# Patient Record
Sex: MALE | Race: WHITE | NOT HISPANIC OR LATINO | Employment: FULL TIME | URBAN - METROPOLITAN AREA
[De-identification: names, ages, dates, MRNs, and addresses within clinical notes are randomized per-mention and may not be internally consistent; named-entity substitution may affect disease eponyms.]

---

## 2020-07-20 ENCOUNTER — OFFICE VISIT (OUTPATIENT)
Dept: URGENT CARE | Facility: CLINIC | Age: 50
End: 2020-07-20
Payer: COMMERCIAL

## 2020-07-20 VITALS
RESPIRATION RATE: 16 BRPM | BODY MASS INDEX: 29.22 KG/M2 | TEMPERATURE: 98 F | WEIGHT: 235 LBS | HEART RATE: 96 BPM | OXYGEN SATURATION: 97 % | HEIGHT: 75 IN | SYSTOLIC BLOOD PRESSURE: 120 MMHG | DIASTOLIC BLOOD PRESSURE: 83 MMHG

## 2020-07-20 DIAGNOSIS — Z03.818 ENCOUNTER FOR OBSERVATION FOR SUSPECTED EXPOSURE TO OTHER BIOLOGICAL AGENTS RULED OUT: ICD-10-CM

## 2020-07-20 DIAGNOSIS — B34.9 VIRAL SYNDROME: Primary | ICD-10-CM

## 2020-07-20 PROCEDURE — U0003 INFECTIOUS AGENT DETECTION BY NUCLEIC ACID (DNA OR RNA); SEVERE ACUTE RESPIRATORY SYNDROME CORONAVIRUS 2 (SARS-COV-2) (CORONAVIRUS DISEASE [COVID-19]), AMPLIFIED PROBE TECHNIQUE, MAKING USE OF HIGH THROUGHPUT TECHNOLOGIES AS DESCRIBED BY CMS-2020-01-R: HCPCS

## 2020-07-20 PROCEDURE — 99203 OFFICE O/P NEW LOW 30 MIN: CPT | Mod: S$GLB,,, | Performed by: NURSE PRACTITIONER

## 2020-07-20 PROCEDURE — 99203 PR OFFICE/OUTPT VISIT, NEW, LEVL III, 30-44 MIN: ICD-10-PCS | Mod: S$GLB,,, | Performed by: NURSE PRACTITIONER

## 2020-07-20 NOTE — LETTER
[address] [facility] ? [place], [address] ? Phone [phone] ? Fax [phone]           Return to Work/School    Patient: Norris Wilkerson  YOB: 1970   Date: 07/20/2020      To Whom It May Concern:     Norris Wilkerson was in contact with/seen in my office on 07/20/2020. COVID-19 is present in our communities across the state. Not all patients are eligible or appropriate to be tested. In this situation, your employee meets the following criteria:     Norris Wilkerson has met the criteria for COVID-19 testing based upon symptoms, travel, and/or potential exposure. The test has been completed and is pending results at this time. During this time the employee is not able to work and should be quarantined per the Centers for Disease Control timelines.      If you have any questions or concerns, or if I can be of further assistance, please do not hesitate to contact me.     Sincerely,      Milagro Petersen NP

## 2020-07-20 NOTE — PATIENT INSTRUCTIONS
"Please follow up with your Primary care provider within 2-5 days if your signs and symptoms have not resolved or worsen.  The usual course of cold symptoms are 10-14 days.     If your condition worsens or fails to improve we recommend that you receive another evaluation at the emergency room immediately or contact your primary medical clinic to discuss your concerns.     You must understand that you have received an Urgent Care treatment only and that you may be released before all of your medical problems are known or treated.   You, the patient, will arrange for follow up care as instructed.     Tylenol or Ibuprofen can also be used as directed for pain/fever unless you have an allergy to them or medical condition such as stomach ulcers, kidney or liver disease or blood thinners etc for which you should not be taking these type of medications.     Take over the counter cough medication as directed as needed for cough.  You should avoid medications with pseudoephedrine or phenylephrine (any medication with "D") if you have high blood pressure as this can cause an elevation in your blood pressure. Instead consider Corcidin HBP as needed to prevent an elevated blood pressure.     Natural remedies of symptoms (as needed) include humidification, saline nasal sprays, and/or steamy showers.  Increase fluids, warm tea with honey, cough drops as needed.  You may also use salt water gargles for sore throat.    IF you received a oral steroid today - As discussed, this can elevate your blood pressure, elevate your blood sugar, water weight gain, nervous energy, redness to the face and dimpling of the skin at the injection site.       Instructions for Patients with Confirmed or Suspected COVID-19    If you are awaiting your test result, you will either be called or it will be released to the patient portal.  If you have any questions about your test, please visit [email or web address] or call our COVID-19 information " line at [phone].      Preventing the Spread of Coronavirus Disease 2019 (COVID-19) in Homes and Residential Communities -- Patients     Prevention steps for people with confirmed or suspected COVID-19 (including persons under investigation) who do not need to be hospitalized and people with confirmed COVID-19 who were hospitalized and determined to be medically stable to go home.      Stay home except to get medical care.    Separate yourself from other people and animals in your home.    Call ahead before visiting your doctor.    Wear a face mask.    Cover your coughs and sneezes.    Clean your hands often.    Avoid sharing personal household items.    Clean all high-touch surfaces every day.    Monitor your symptoms. Seek prompt medical attention if your illness is worsening (e.g., difficulty breathing). Before seeking care, call your healthcare provider.    If you have a medical emergency and must call 911, notify the dispatcher that you have or are being evaluated for COVID-19. If possible, put on a face mask before emergency medical services arrive.    Use the following symptom-based strategy to return to normal activity following a suspected or confirmed case of COVID-19. Continue isolation until:   o At least 3 days (72 hours) have passed since recovery defined as resolution of fever without the use of fever-reducing medications and improvement in respiratory symptoms (e.g. cough, shortness of breath), and   o At least 10 days have passed since symptoms first appeared.     Precautions for household members, intimate partners and caregivers in a non-healthcare setting of a patient with symptomatic laboratory-confirmed COVID-19 or a patient under investigation.     Household members, intimate partners and caregivers in a non-healthcare setting may have close contact with a person with symptomatic, laboratory-confirmed COVID-19 or a person under investigation. Close contacts should monitor  their health; they should call their healthcare provider right away if they develop symptoms suggestive of COVID-19 (e.g., fever, cough, shortness of breath). Close contacts should also follow these recommendations:     · Stay home for the duration of the time recommended by healthcare provider, except to get medical care. Separate yourself from other people and animals in the home.  · Monitor the patients symptoms. If the patient is getting sicker, call his or her healthcare provider. If the patient has a medical emergency and you need to call 911, notify the dispatch personnel that the patient has or is being evaluated for COVID-19.   · Wear a facemask when around other people such as sharing a room or vehicle and before entering a healthcare provider's office.  · Cover coughs and sneezes with a tissue. Throw used tissues in a lined trash can immediately and wash hands.  · Clean hands often with soap and water for at least 20 seconds or with an alcohol-based hand , rubbing hands together until they feel dry. Avoid touching your eyes, nose, and mouth with unwashed hands.  · Clean all high-touch; surfaces every day, including counters, tabletops, doorknobs, bathroom fixtures, toilets, phones, keyboards, tablets, bedside tables, etc. Use a household cleaning spray or wipe according to label instructions.  · Avoid sharing personal household items such as dishes, drinking glasses, cups, towels, bedding, etc. After these items are used, they should be washed thoroughly with soap and water.  · Use the following symptom-based strategy to return to normal activity following a suspected or confirmed case of COVID-19. Continue isolation until:       Viral Syndrome (Adult)  A viral illness may cause a number of symptoms. The symptoms depend on the part of the body that the virus affects. If it settles in your nose, throat, and lungs, it may cause cough, sore throat, congestion, and sometimes headache. If it  "settles in your stomach and intestinal tract, it may cause vomiting and diarrhea. Sometimes it causes vague symptoms like "aching all over," feeling tired, loss of appetite, or fever.  A viral illness usually lasts 1 to 2 weeks, but sometimes it lasts longer. In some cases, a more serious infection can look like a viral syndrome in the first few days of the illness. You may need another exam and additional tests to know the difference. Watch for the warning signs listed below.  Home care  Follow these guidelines for taking care of yourself at home:  · If symptoms are severe, rest at home for the first 2 to 3 days.  · Stay away from cigarette smoke - both your smoke and the smoke from others.  · You may use over-the-counter acetaminophen or ibuprofen for fever, muscle aching, and headache, unless another medicine was prescribed for this. If you have chronic liver or kidney disease or ever had a stomach ulcer or GI bleeding, talk with your doctor before using these medicines. No one who is younger than 18 and ill with a fever should take aspirin. It may cause severe disease or death.  · Your appetite may be poor, so a light diet is fine. Avoid dehydration by drinking 8 to 12 8-ounce glasses of fluids each day. This may include water; orange juice; lemonade; apple, grape, and cranberry juice; clear fruit drinks; electrolyte replacement and sports drinks; and decaffeinated teas and coffee. If you have been diagnosed with a kidney disease, ask your doctor how much and what types of fluids you should drink to prevent dehydration. If you have kidney disease, drinking too much fluid can cause it build up in the your body and be dangerous to your health.  · Over-the-counter remedies won't shorten the length of the illness but may be helpful for cough, sore throat; and nasal and sinus congestion. Don't use decongestants if you have high blood pressure.  Follow-up care  Follow up with your healthcare provider if you do not " improve over the next week.  Call 911  Get emergency medical care if any of the following occur:  · Convulsion  · Feeling weak, dizzy, or like you are going to faint  · Chest pain, shortness of breath, wheezing, or difficulty breathing  When to seek medical advice  Call your healthcare provider right away if any of these occur:  · Cough with lots of colored sputum (mucus) or blood in your sputum  · Chest pain, shortness of breath, wheezing, or difficulty breathing  · Severe headache; face, neck, or ear pain  · Severe, constant pain in the lower right side of your belly (abdominal)  · Continued vomiting (cant keep liquids down)  · Frequent diarrhea (more than 5 times a day); blood (red or black color) or mucus in diarrhea  · Feeling weak, dizzy, or like you are going to faint  · Extreme thirst  · Fever of 100.4°F (38°C) or higher, or as directed by your healthcare provider  Date Last Reviewed: 9/25/2015  © 1342-5320 [employer]. [address] [place] [address], [place] [address]. All rights reserved. This information is not intended as a substitute for professional medical care. Always follow your healthcare professional's instructions.        · At least 3 days (72 hours) have passed since recovery defined as resolution of fever without the use of fever-reducing medications and improvement in respiratory symptoms (e.g. cough, shortness of breath), and   · At least 10 days have passed since symptoms first appeared.

## 2020-07-20 NOTE — PROGRESS NOTES
"Subjective:       Patient ID: Norrsi Wilkerson is a 50 y.o. male.    Vitals:  height is 6' 3" (1.905 m) and weight is 106.6 kg (235 lb). His tympanic temperature is 97.6 °F (36.4 °C). His blood pressure is 120/83 and his pulse is 96. His respiration is 16 and oxygen saturation is 97%.     Chief Complaint: COVID-19 Concerns, Shortness of Breath, Fever (highest temp 101.0), and Generalized Body Aches    Shortness of Breath  This is a new problem. Episode onset: 2 days. The problem has been waxing and waning. Associated symptoms include a fever, headaches and a sore throat. Pertinent negatives include no ear pain, hemoptysis, rash, sputum production, vomiting or wheezing. Exacerbated by: laying down. Treatments tried: tylenol.   Fever   This is a new problem. Episode onset: 2 days. The problem has been waxing and waning. The temperature was taken using an oral thermometer. Associated symptoms include coughing, diarrhea, headaches and a sore throat. Pertinent negatives include no congestion, ear pain, nausea, rash, vomiting or wheezing. He has tried acetaminophen for the symptoms. The treatment provided moderate relief.       Constitution: Positive for chills, sweating, fatigue and fever.   HENT: Positive for sore throat. Negative for ear pain, congestion, sinus pain, sinus pressure and voice change.    Neck: Negative for painful lymph nodes.   Eyes: Negative for eye redness.   Respiratory: Positive for cough. Negative for chest tightness, sputum production, bloody sputum, COPD, shortness of breath, stridor, wheezing and asthma.    Gastrointestinal: Positive for diarrhea. Negative for nausea and vomiting.   Musculoskeletal: Positive for muscle ache.   Skin: Negative for rash.   Allergic/Immunologic: Negative for seasonal allergies and asthma.   Neurological: Positive for headaches.   Hematologic/Lymphatic: Negative for swollen lymph nodes.       Objective:      Physical Exam   Patient was seen remotely due to State of " Emergency for the COVID-19 outbreak.  Audio Only Visit     The patient location is: [facility] ([place])  The chief complaint leading to consultation is: COVID  Visit type: Virtual visit with audio only Total time spent with patient: 15 minutes     The reason for the audio only service rather than synchronous audio and video virtual visit was related to technical difficulties or patient preference/necessity.     Each patient to whom I provide medical services by telemedicine is:  (1) informed of the relationship between the physician and patient and the respective role of any other health care provider with respect to management of the patient; and (2) notified that they may decline to receive medical services by telemedicine and may withdraw from such care at any time. Patient verbally consented to receive this service via voice-only telephone call.    This service was not originating from a related E/M service provided within the previous 7 days nor will  to an E/M service or procedure within the next 24 hours or my soonest available appointment.  Prevailing standard of care was able to be met in this audio-only visit.          Assessment:       1. Viral syndrome    2. Encounter for observation for suspected exposure to other biological agents ruled out        Plan:         Viral syndrome  -     COVID-19 Routine Screening    Encounter for observation for suspected exposure to other biological agents ruled out  -     COVID-19 Routine Screening      Patient Instructions   Please follow up with your Primary care provider within 2-5 days if your signs and symptoms have not resolved or worsen.  The usual course of cold symptoms are 10-14 days.     If your condition worsens or fails to improve we recommend that you receive another evaluation at the emergency room immediately or contact your primary medical clinic to discuss your concerns.     You must understand that you have received an Urgent Care treatment only and that  "you may be released before all of your medical problems are known or treated.   You, the patient, will arrange for follow up care as instructed.     Tylenol or Ibuprofen can also be used as directed for pain/fever unless you have an allergy to them or medical condition such as stomach ulcers, kidney or liver disease or blood thinners etc for which you should not be taking these type of medications.     Take over the counter cough medication as directed as needed for cough.  You should avoid medications with pseudoephedrine or phenylephrine (any medication with "D") if you have high blood pressure as this can cause an elevation in your blood pressure. Instead consider Corcidin HBP as needed to prevent an elevated blood pressure.     Natural remedies of symptoms (as needed) include humidification, saline nasal sprays, and/or steamy showers.  Increase fluids, warm tea with honey, cough drops as needed.  You may also use salt water gargles for sore throat.    IF you received a oral steroid today - As discussed, this can elevate your blood pressure, elevate your blood sugar, water weight gain, nervous energy, redness to the face and dimpling of the skin at the injection site.       Instructions for Patients with Confirmed or Suspected COVID-19    If you are awaiting your test result, you will either be called or it will be released to the patient portal.  If you have any questions about your test, please visit [email or web address] or call our COVID-19 information line at [phone].      Preventing the Spread of Coronavirus Disease 2019 (COVID-19) in Homes and Residential Communities -- Patients     Prevention steps for people with confirmed or suspected COVID-19 (including persons under investigation) who do not need to be hospitalized and people with confirmed COVID-19 who were hospitalized and determined to be medically stable to go home.      Stay home except to get medical care.    Separate yourself " from other people and animals in your home.    Call ahead before visiting your doctor.    Wear a face mask.    Cover your coughs and sneezes.    Clean your hands often.    Avoid sharing personal household items.    Clean all high-touch surfaces every day.    Monitor your symptoms. Seek prompt medical attention if your illness is worsening (e.g., difficulty breathing). Before seeking care, call your healthcare provider.    If you have a medical emergency and must call 911, notify the dispatcher that you have or are being evaluated for COVID-19. If possible, put on a face mask before emergency medical services arrive.    Use the following symptom-based strategy to return to normal activity following a suspected or confirmed case of COVID-19. Continue isolation until:   o At least 3 days (72 hours) have passed since recovery defined as resolution of fever without the use of fever-reducing medications and improvement in respiratory symptoms (e.g. cough, shortness of breath), and   o At least 10 days have passed since symptoms first appeared.     Precautions for household members, intimate partners and caregivers in a non-healthcare setting of a patient with symptomatic laboratory-confirmed COVID-19 or a patient under investigation.     Household members, intimate partners and caregivers in a non-healthcare setting may have close contact with a person with symptomatic, laboratory-confirmed COVID-19 or a person under investigation. Close contacts should monitor their health; they should call their healthcare provider right away if they develop symptoms suggestive of COVID-19 (e.g., fever, cough, shortness of breath). Close contacts should also follow these recommendations:     · Stay home for the duration of the time recommended by healthcare provider, except to get medical care. Separate yourself from other people and animals in the home.  · Monitor the patients symptoms. If the patient is getting sicker,  "call his or her healthcare provider. If the patient has a medical emergency and you need to call 911, notify the dispatch personnel that the patient has or is being evaluated for COVID-19.   · Wear a facemask when around other people such as sharing a room or vehicle and before entering a healthcare provider's office.  · Cover coughs and sneezes with a tissue. Throw used tissues in a lined trash can immediately and wash hands.  · Clean hands often with soap and water for at least 20 seconds or with an alcohol-based hand , rubbing hands together until they feel dry. Avoid touching your eyes, nose, and mouth with unwashed hands.  · Clean all high-touch; surfaces every day, including counters, tabletops, doorknobs, bathroom fixtures, toilets, phones, keyboards, tablets, bedside tables, etc. Use a household cleaning spray or wipe according to label instructions.  · Avoid sharing personal household items such as dishes, drinking glasses, cups, towels, bedding, etc. After these items are used, they should be washed thoroughly with soap and water.  · Use the following symptom-based strategy to return to normal activity following a suspected or confirmed case of COVID-19. Continue isolation until:       Viral Syndrome (Adult)  A viral illness may cause a number of symptoms. The symptoms depend on the part of the body that the virus affects. If it settles in your nose, throat, and lungs, it may cause cough, sore throat, congestion, and sometimes headache. If it settles in your stomach and intestinal tract, it may cause vomiting and diarrhea. Sometimes it causes vague symptoms like "aching all over," feeling tired, loss of appetite, or fever.  A viral illness usually lasts 1 to 2 weeks, but sometimes it lasts longer. In some cases, a more serious infection can look like a viral syndrome in the first few days of the illness. You may need another exam and additional tests to know the difference. Watch for the warning " signs listed below.  Home care  Follow these guidelines for taking care of yourself at home:  · If symptoms are severe, rest at home for the first 2 to 3 days.  · Stay away from cigarette smoke - both your smoke and the smoke from others.  · You may use over-the-counter acetaminophen or ibuprofen for fever, muscle aching, and headache, unless another medicine was prescribed for this. If you have chronic liver or kidney disease or ever had a stomach ulcer or GI bleeding, talk with your doctor before using these medicines. No one who is younger than 18 and ill with a fever should take aspirin. It may cause severe disease or death.  · Your appetite may be poor, so a light diet is fine. Avoid dehydration by drinking 8 to 12 8-ounce glasses of fluids each day. This may include water; orange juice; lemonade; apple, grape, and cranberry juice; clear fruit drinks; electrolyte replacement and sports drinks; and decaffeinated teas and coffee. If you have been diagnosed with a kidney disease, ask your doctor how much and what types of fluids you should drink to prevent dehydration. If you have kidney disease, drinking too much fluid can cause it build up in the your body and be dangerous to your health.  · Over-the-counter remedies won't shorten the length of the illness but may be helpful for cough, sore throat; and nasal and sinus congestion. Don't use decongestants if you have high blood pressure.  Follow-up care  Follow up with your healthcare provider if you do not improve over the next week.  Call 911  Get emergency medical care if any of the following occur:  · Convulsion  · Feeling weak, dizzy, or like you are going to faint  · Chest pain, shortness of breath, wheezing, or difficulty breathing  When to seek medical advice  Call your healthcare provider right away if any of these occur:  · Cough with lots of colored sputum (mucus) or blood in your sputum  · Chest pain, shortness of breath, wheezing, or difficulty  breathing  · Severe headache; face, neck, or ear pain  · Severe, constant pain in the lower right side of your belly (abdominal)  · Continued vomiting (cant keep liquids down)  · Frequent diarrhea (more than 5 times a day); blood (red or black color) or mucus in diarrhea  · Feeling weak, dizzy, or like you are going to faint  · Extreme thirst  · Fever of 100.4°F (38°C) or higher, or as directed by your healthcare provider  Date Last Reviewed: 9/25/2015 © 2000-2017 [employer]. [address] [place] [id number]. All rights reserved. This information is not intended as a substitute for professional medical care. Always follow your healthcare professional's instructions.        · At least 3 days (72 hours) have passed since recovery defined as resolution of fever without the use of fever-reducing medications and improvement in respiratory symptoms (e.g. cough, shortness of breath), and   · At least 10 days have passed since symptoms first appeared.

## 2020-07-23 LAB — SARS-COV-2 RNA RESP QL NAA+PROBE: NOT DETECTED

## 2020-08-01 ENCOUNTER — OFFICE VISIT (OUTPATIENT)
Dept: URGENT CARE | Facility: CLINIC | Age: 50
End: 2020-08-01
Payer: COMMERCIAL

## 2020-08-01 VITALS
BODY MASS INDEX: 29.22 KG/M2 | HEIGHT: 75 IN | WEIGHT: 235 LBS | OXYGEN SATURATION: 96 % | HEART RATE: 102 BPM | TEMPERATURE: 99 F | RESPIRATION RATE: 15 BRPM

## 2020-08-01 DIAGNOSIS — J02.9 SORE THROAT: Primary | ICD-10-CM

## 2020-08-01 LAB
CTP QC/QA: YES
S PYO RRNA THROAT QL PROBE: NEGATIVE

## 2020-08-01 PROCEDURE — 99213 OFFICE O/P EST LOW 20 MIN: CPT | Mod: 25,S$GLB,, | Performed by: FAMILY MEDICINE

## 2020-08-01 PROCEDURE — 87880 POCT RAPID STREP A: ICD-10-PCS | Mod: QW,S$GLB,, | Performed by: FAMILY MEDICINE

## 2020-08-01 PROCEDURE — 99213 PR OFFICE/OUTPT VISIT, EST, LEVL III, 20-29 MIN: ICD-10-PCS | Mod: 25,S$GLB,, | Performed by: FAMILY MEDICINE

## 2020-08-01 PROCEDURE — 87081 CULTURE SCREEN ONLY: CPT

## 2020-08-01 PROCEDURE — 87880 STREP A ASSAY W/OPTIC: CPT | Mod: QW,S$GLB,, | Performed by: FAMILY MEDICINE

## 2020-08-01 RX ORDER — LIDOCAINE HYDROCHLORIDE 20 MG/ML
SOLUTION OROPHARYNGEAL
Qty: 100 ML | Refills: 0 | Status: SHIPPED | OUTPATIENT
Start: 2020-08-01

## 2020-08-01 RX ORDER — CLINDAMYCIN HYDROCHLORIDE 300 MG/1
300 CAPSULE ORAL EVERY 8 HOURS
Qty: 30 CAPSULE | Refills: 0 | Status: SHIPPED | OUTPATIENT
Start: 2020-08-01

## 2020-08-01 RX ORDER — AMOXICILLIN 500 MG/1
1000 TABLET, FILM COATED ORAL DAILY
Qty: 20 TABLET | Refills: 0 | Status: SHIPPED | OUTPATIENT
Start: 2020-08-01 | End: 2020-08-01

## 2020-08-01 RX ORDER — CLINDAMYCIN PHOSPHATE 150 MG/ML
600 INJECTION, SOLUTION INTRAVENOUS
Status: COMPLETED | OUTPATIENT
Start: 2020-08-01 | End: 2020-08-01

## 2020-08-01 RX ADMIN — CLINDAMYCIN PHOSPHATE 600 MG: 150 INJECTION, SOLUTION INTRAVENOUS at 10:08

## 2020-08-01 NOTE — PROGRESS NOTES
"Subjective:       Patient ID: Norris Wilkerson is a 50 y.o. male.    Vitals:  height is 6' 3" (1.905 m) and weight is 106.6 kg (235 lb). His temperature is 98.7 °F (37.1 °C). His pulse is 102. His respiration is 15 and oxygen saturation is 96%.     Chief Complaint: Sore Throat    Patient with only sore throat for the last 3 days. He denies fevers, chills, chest pain, sob, loss of taste or smell. He denies any exposure to anyone with strep throat. He recently had a negative covid swab.     Sore Throat   This is a new problem. The current episode started in the past 7 days (Thursday ). The problem has been gradually worsening. The pain is worse on the left side. There has been no fever. The pain is at a severity of 9/10. The pain is moderate. Associated symptoms include swollen glands and trouble swallowing. Pertinent negatives include no congestion, coughing, ear pain, shortness of breath, stridor or vomiting. He has tried oral narcotic analgesics for the symptoms. The treatment provided no relief.       Constitution: Negative for chills, sweating, fatigue and fever.   HENT: Positive for sore throat and trouble swallowing. Negative for ear pain, congestion, sinus pain, sinus pressure and voice change.    Neck: Positive for painful lymph nodes.   Eyes: Negative for eye redness.   Respiratory: Negative for chest tightness, cough, sputum production, bloody sputum, COPD, shortness of breath, stridor, wheezing and asthma.    Gastrointestinal: Negative for nausea and vomiting.   Musculoskeletal: Negative for muscle ache.   Skin: Negative for rash.   Allergic/Immunologic: Negative for seasonal allergies and asthma.   Hematologic/Lymphatic: Positive for swollen lymph nodes.       Objective:      Physical Exam   HENT:   Head: Normocephalic and atraumatic.   Ears:   Right Ear: External ear normal.   Left Ear: External ear normal.   Nose: No congestion.   Mouth/Throat: Mucous membranes are normal. Posterior oropharyngeal edema and " posterior oropharyngeal erythema present.   Neck: Normal range of motion. Neck supple.   Abdominal: Normal appearance.   Musculoskeletal:      Cervical back: Normal.   Neurological: He is alert.         Assessment:       1. Sore throat        Plan:         Sore throat  -discussed with patient concern for possible abscess, will treat with clindamycin, if any worsening he will proceed to ER  -     POCT rapid strep A  -     POCT Strep A, Molecular  -     Strep A culture, throat  -     clindamycin injection 600 mg  -     clindamycin (CLEOCIN) 300 MG capsule; Take 1 capsule (300 mg total) by mouth every 8 (eight) hours.  Dispense: 30 capsule; Refill: 0  -     Ambulatory referral/consult to ENT    Other orders  -     lidocaine HCl 2% (LIDOCAINE VISCOUS) 2 % Soln; by Mucous Membrane route every 3 (three) hours. 5-10 ml gargle and spit  Dispense: 100 mL; Refill: 0

## 2020-08-01 NOTE — PATIENT INSTRUCTIONS
Pharyngitis: Strep (Presumed)    You have pharyngitis (sore throat). The cause is thought to be the streptococcus, or strep, bacterium. Strep throat infection can cause throat pain that is worse when swallowing, aching all over, headache, and fever. The infection may be spread by coughing, kissing, or touching others after touching your mouth or nose. Antibiotic medications are given to treat the infection.  Home care  · Rest at home. Drink plenty of fluids to avoid dehydration.  · No work or school for the first 2 days of taking the antibiotics. After this time, you will not be contagious. You can then return to work or school if you are feeling better.   · The antibiotic medication must be taken for the full 10 days, even if you feel better. This is very important to ensure the infection is treated. It is also important to prevent drug-resistant organisms from developing. If you were given an antibiotic shot, no more antibiotics are needed.  · You may use acetaminophen or ibuprofen to control pain or fever, unless another medicine was prescribed for this. If you have chronic liver or kidney disease or ever had a stomach ulcer or GI bleeding, talk with your doctor before using these medicines.  · Throat lozenges or a throat-numbing sprays can help reduce throat pain. Gargling with warm salt water can also help. Dissolve 1/2 teaspoon of salt in 1 8 ounce glass of warm water.   · Avoid salty or spicy foods, which can irritate the throat.  Follow-up care  Follow up with your healthcare provider or our staff if you are not improving over the next week.  When to seek medical advice  Call your healthcare provider right away if any of these occur:  · Fever as directed by your doctor.   · New or worsening ear pain, sinus pain, or headache  · Painful lumps in the back of neck  · Stiff neck  · Lymph nodes are getting larger  · Inability to swallow liquids, excessive drooling, or inability to open mouth wide due to throat  pain  · Signs of dehydration (very dark urine or no urine, sunken eyes, dizziness)  · Trouble breathing or noisy breathing  · Muffled voice  · New rash  Date Last Reviewed: 4/13/2015  © 4399-6471 [employer]. [address] [place] [address], [place] [address]. All rights reserved. This information is not intended as a substitute for professional medical care. Always follow your healthcare professional's instructions.

## 2020-08-04 ENCOUNTER — TELEPHONE (OUTPATIENT)
Dept: URGENT CARE | Facility: CLINIC | Age: 50
End: 2020-08-04

## 2020-08-04 LAB — BACTERIA THROAT CULT: NORMAL

## 2020-08-04 NOTE — TELEPHONE ENCOUNTER
Strep culture - Spoke with patient and gave him neg culture results. He said his throat is still not feeling well, I advised to follow up with ENT.    ----- Message from Xuan Sahni PA-C sent at 8/3/2020 12:49 PM CDT -----  Please let patient know of neg Strep culture results.  Thanks.

## 2021-04-16 ENCOUNTER — PATIENT MESSAGE (OUTPATIENT)
Dept: RESEARCH | Facility: HOSPITAL | Age: 51
End: 2021-04-16

## 2022-01-05 ENCOUNTER — HOSPITAL ENCOUNTER (EMERGENCY)
Facility: HOSPITAL | Age: 52
Discharge: HOME | End: 2022-01-05
Attending: EMERGENCY MEDICINE
Payer: COMMERCIAL

## 2022-01-05 VITALS
SYSTOLIC BLOOD PRESSURE: 127 MMHG | OXYGEN SATURATION: 98 % | HEIGHT: 75 IN | BODY MASS INDEX: 28.6 KG/M2 | RESPIRATION RATE: 18 BRPM | WEIGHT: 230 LBS | DIASTOLIC BLOOD PRESSURE: 80 MMHG | TEMPERATURE: 98 F | HEART RATE: 74 BPM

## 2022-01-05 DIAGNOSIS — R10.13 EPIGASTRIC PAIN: Primary | ICD-10-CM

## 2022-01-05 DIAGNOSIS — R03.0 ELEVATED BP WITHOUT DIAGNOSIS OF HYPERTENSION: ICD-10-CM

## 2022-01-05 LAB
ALBUMIN SERPL BCP-MCNC: 4.5 G/DL (ref 3.5–5.2)
ALP SERPL-CCNC: 66 U/L (ref 55–135)
ALT SERPL W/O P-5'-P-CCNC: 23 U/L (ref 10–44)
ANION GAP SERPL CALC-SCNC: 9 MMOL/L (ref 8–16)
AST SERPL-CCNC: 17 U/L (ref 10–40)
BASOPHILS # BLD AUTO: 0.05 K/UL (ref 0–0.2)
BASOPHILS NFR BLD: 0.5 % (ref 0–1.9)
BILIRUB SERPL-MCNC: 0.3 MG/DL (ref 0.1–1)
BILIRUB UR QL STRIP: NEGATIVE
BUN SERPL-MCNC: 13 MG/DL (ref 6–20)
CALCIUM SERPL-MCNC: 9.3 MG/DL (ref 8.7–10.5)
CHLORIDE SERPL-SCNC: 103 MMOL/L (ref 95–110)
CLARITY UR: CLEAR
CO2 SERPL-SCNC: 27 MMOL/L (ref 23–29)
COLOR UR: YELLOW
CREAT SERPL-MCNC: 1.2 MG/DL (ref 0.5–1.4)
DIFFERENTIAL METHOD: ABNORMAL
EOSINOPHIL # BLD AUTO: 0.2 K/UL (ref 0–0.5)
EOSINOPHIL NFR BLD: 1.8 % (ref 0–8)
ERYTHROCYTE [DISTWIDTH] IN BLOOD BY AUTOMATED COUNT: 12.3 % (ref 11.5–14.5)
EST. GFR  (AFRICAN AMERICAN): >60 ML/MIN/1.73 M^2
EST. GFR  (NON AFRICAN AMERICAN): >60 ML/MIN/1.73 M^2
GLUCOSE SERPL-MCNC: 129 MG/DL (ref 70–110)
GLUCOSE UR QL STRIP: NEGATIVE
HCT VFR BLD AUTO: 48.7 % (ref 40–54)
HGB BLD-MCNC: 16.5 G/DL (ref 14–18)
HGB UR QL STRIP: NEGATIVE
IMM GRANULOCYTES # BLD AUTO: 0.05 K/UL (ref 0–0.04)
IMM GRANULOCYTES NFR BLD AUTO: 0.5 % (ref 0–0.5)
KETONES UR QL STRIP: NEGATIVE
LEUKOCYTE ESTERASE UR QL STRIP: NEGATIVE
LIPASE SERPL-CCNC: 22 U/L (ref 4–60)
LYMPHOCYTES # BLD AUTO: 1.9 K/UL (ref 1–4.8)
LYMPHOCYTES NFR BLD: 20.3 % (ref 18–48)
MCH RBC QN AUTO: 30.7 PG (ref 27–31)
MCHC RBC AUTO-ENTMCNC: 33.9 G/DL (ref 32–36)
MCV RBC AUTO: 91 FL (ref 82–98)
MONOCYTES # BLD AUTO: 0.7 K/UL (ref 0.3–1)
MONOCYTES NFR BLD: 6.9 % (ref 4–15)
NEUTROPHILS # BLD AUTO: 6.7 K/UL (ref 1.8–7.7)
NEUTROPHILS NFR BLD: 70 % (ref 38–73)
NITRITE UR QL STRIP: NEGATIVE
NRBC BLD-RTO: 0 /100 WBC
PH UR STRIP: 6 [PH] (ref 5–8)
PLATELET # BLD AUTO: 246 K/UL (ref 150–450)
PMV BLD AUTO: 9.1 FL (ref 9.2–12.9)
POTASSIUM SERPL-SCNC: 4.2 MMOL/L (ref 3.5–5.1)
PROT SERPL-MCNC: 7.3 G/DL (ref 6–8.4)
PROT UR QL STRIP: NEGATIVE
RBC # BLD AUTO: 5.38 M/UL (ref 4.6–6.2)
SODIUM SERPL-SCNC: 139 MMOL/L (ref 136–145)
SP GR UR STRIP: 1.02 (ref 1–1.03)
URN SPEC COLLECT METH UR: NORMAL
UROBILINOGEN UR STRIP-ACNC: NEGATIVE EU/DL
WBC # BLD AUTO: 9.52 K/UL (ref 3.9–12.7)

## 2022-01-05 PROCEDURE — 99285 EMERGENCY DEPT VISIT HI MDM: CPT | Mod: 25

## 2022-01-05 PROCEDURE — 25500020 PHARM REV CODE 255: Performed by: EMERGENCY MEDICINE

## 2022-01-05 PROCEDURE — 96375 TX/PRO/DX INJ NEW DRUG ADDON: CPT

## 2022-01-05 PROCEDURE — 80053 COMPREHEN METABOLIC PANEL: CPT | Performed by: EMERGENCY MEDICINE

## 2022-01-05 PROCEDURE — 96374 THER/PROPH/DIAG INJ IV PUSH: CPT | Mod: 59

## 2022-01-05 PROCEDURE — 83690 ASSAY OF LIPASE: CPT | Performed by: EMERGENCY MEDICINE

## 2022-01-05 PROCEDURE — 81003 URINALYSIS AUTO W/O SCOPE: CPT | Performed by: EMERGENCY MEDICINE

## 2022-01-05 PROCEDURE — 85025 COMPLETE CBC W/AUTO DIFF WBC: CPT | Performed by: EMERGENCY MEDICINE

## 2022-01-05 PROCEDURE — 63600175 PHARM REV CODE 636 W HCPCS: Performed by: EMERGENCY MEDICINE

## 2022-01-05 RX ORDER — ONDANSETRON 2 MG/ML
4 INJECTION INTRAMUSCULAR; INTRAVENOUS
Status: COMPLETED | OUTPATIENT
Start: 2022-01-05 | End: 2022-01-05

## 2022-01-05 RX ORDER — HYDROCODONE BITARTRATE AND ACETAMINOPHEN 5; 325 MG/1; MG/1
1 TABLET ORAL EVERY 6 HOURS PRN
Qty: 12 TABLET | Refills: 0 | Status: SHIPPED | OUTPATIENT
Start: 2022-01-05 | End: 2023-10-20 | Stop reason: CLARIF

## 2022-01-05 RX ORDER — ONDANSETRON 4 MG/1
4 TABLET, ORALLY DISINTEGRATING ORAL EVERY 6 HOURS PRN
Qty: 20 TABLET | Refills: 0 | Status: SHIPPED | OUTPATIENT
Start: 2022-01-05

## 2022-01-05 RX ORDER — KETOROLAC TROMETHAMINE 30 MG/ML
15 INJECTION, SOLUTION INTRAMUSCULAR; INTRAVENOUS
Status: COMPLETED | OUTPATIENT
Start: 2022-01-05 | End: 2022-01-05

## 2022-01-05 RX ORDER — MORPHINE SULFATE 4 MG/ML
4 INJECTION, SOLUTION INTRAMUSCULAR; INTRAVENOUS
Status: DISCONTINUED | OUTPATIENT
Start: 2022-01-05 | End: 2022-01-05 | Stop reason: HOSPADM

## 2022-01-05 RX ADMIN — KETOROLAC TROMETHAMINE 15 MG: 30 INJECTION, SOLUTION INTRAMUSCULAR at 02:01

## 2022-01-05 RX ADMIN — IOHEXOL 100 ML: 350 INJECTION, SOLUTION INTRAVENOUS at 03:01

## 2022-01-05 RX ADMIN — ONDANSETRON 4 MG: 2 INJECTION INTRAMUSCULAR; INTRAVENOUS at 02:01

## 2022-01-05 NOTE — ED PROVIDER NOTES
History       Chief Complaint   Patient presents with    Epigastric pain     Epigastric to lower abdominal pain that started around 11 pm. Pain described as cramping. States this has happened a total of 3 times in the last 10 days. Each time will last about 4 hours. States he has had to make himself throw up to feel better. No medications taken for pain. Denies any medical history. Bowel movements have been normal.        HPI    Norris Wilkerson 51 y.o. presents to the emergency department today with a complaint of abdominal pain. The patient's pain is in epigastric region and goes to the mid abdomen.  It started about 11:00 p.m..  It is constant.  It is throbbing with some sharp spikes.  It does not radiate.  He has had nausea , but no spontaneous vomiting.  He does report trying to make himself throw up in order to feel better.  No diarrhea.  No melena, no hematochezia.  No fever, chills or sweats.   Denies any excessive NSAID or alcohol use.  No trauma.  This has happened 2 other times recently, patient reports that meat loaf, mashed potatoes, green beans around 430 and popcorn later this evening.      ROS    Constitutional: No fever, no chills.  Eyes: No discharge. No pain.  HENT: No nasal drainage. No ear ache. No sore throat.  Cardiovascular: No chest pain, no palpitations.  Respiratory: No cough, no shortness of breath.  Gastrointestinal:  No melena.  No anorexia.  Genitourinary: No hematuria, dysuria, urgency.  Musculoskeletal: No back pain.   Skin: No rashes, no lesions.  Neurological: No headache, no focal weakness.    Otherwise remaining ROS negative     The history is provided by the patient      ALLERGIES REVIEWED  MEDICATIONS REVIEWED  PMH/PSH/SOC/FH REVIEWED      No past medical history on file.    Past Surgical History:   Procedure Laterality Date    NECK SURGERY         Social History     Socioeconomic History    Marital status: Single   Tobacco Use    Smoking status: Current Every Day Smoker      "Packs/day: 0.50     Types: Cigarettes    Smokeless tobacco: Never Used   Substance and Sexual Activity    Alcohol use: No    Drug use: No       Family History   Problem Relation Age of Onset    Diabetes Mother     Hypertension Father     Parkinsonism Father          Physical Exam    Nursing/Ancillary staff note reviewed.  VS reviewed  BP (!) 140/85   Pulse 74   Temp 97.6 °F (36.4 °C) (Oral)   Resp 19   Ht 6' 3" (1.905 m)   Wt 104.3 kg (230 lb)   SpO2 95%   BMI 28.75 kg/m²     General Appearance: The patient is alert, has no immediate need for airway protection and no signs of toxicity. No acute distress. Lying in bed but able to sit up without difficulty.   HEENT: Eyes: Pupils equal and round no pallor or injection. Extra ocular movements intact. No drainage.       Mouth: Mucous membranes are moist. Oropharynx clear.   Neck:Neck is supple non-tender. No lymphadenopathy. No stridor.   Respiratory: There are no retractions, lungs are clear to auscultation. No wheezing, no crackles. Chest wall nontender to palpation.   Cardiovascular: Regular rate and rhythm. No murmurs, rubs or gallops.  Gastrointestinal:  Abdomen is soft and has slight tenderness to palpation in the epigastric region and right upper quadrant, no guarding, no rebound, no periumbilical pain, no Nieto's sign, no pain in the right lower quadrant or at McBurney's point, no masses, bowel sounds normal. No pulsatile mass. No CVA tenderness.  Neurological: Alert and oriented x 4. CN II-XII grossly intact. No focal weakness. Strength intact 5/5 bilaterally in upper and lower extremities.   Skin: Warm and dry, no rashes.  Musculoskeletal: Extremities are non-tender, non-swollen and have full range of motion. Back NTTP along the midline.     DIFFERENTIAL DIAGNOSIS: After history and physical exam a differential diagnosis was considered, but was not limited to, appendicitis, cholelithiasis, cholecystitis, pancreatitis, gastritis, gastroenteritis, " ileus, small bowel obstruction, diverticulitis and urinary tract infection.    ED Course       ED Course as of 01/05/22 0450 Wed Jan 05, 2022   0252 Pain  [JA]   0254 Lipase: 22 [JA]   0254 BILIRUBIN TOTAL: 0.3 [JA]   0254 AST: 17 [JA]   0254 ALT: 23 [JA]   0254 Alkaline Phosphatase: 66 [JA]   0446 Patient is feeling much improved. [JA]      ED Course User Index  [JA] Rut Cuenca MD                [facility]    Medical Decision Making:   Initial management:  Norris Wilkerson 51 y.o. male presents to the ED today with epigastric abdominal pain.  The patient was seen and examined, see chart. The history and physical exam was obtained, see chart. The nursing notes and vital signs were reviewed, see chart.        Medications   morphine injection 4 mg (4 mg Intravenous Not Given 1/5/22 0300)   ondansetron injection 4 mg (4 mg Intravenous Given 1/5/22 0221)   ketorolac injection 15 mg (15 mg Intravenous Given 1/5/22 0221)   iohexoL (OMNIPAQUE 350) injection 100 mL (100 mLs Intravenous Given 1/5/22 0321)         ED Course as of 01/05/22 0450 Wed Jan 05, 2022   0252 Pain  [JA]   0254 Lipase: 22 [JA]   0254 BILIRUBIN TOTAL: 0.3 [JA]   0254 AST: 17 [JA]   0254 ALT: 23 [JA]   0254 Alkaline Phosphatase: 66 [JA]   0446 Patient is feeling much improved. [JA]      ED Course User Index  [JA] Rut Cuenca MD                History:   I obtained history from:  The patient  Old Medical Records: I decided to obtain old medical records.     Patient improved with treatment in the emergency department and comfortable going home. Discussed reasons to return and importance of followup.  Patient understands that the emergency visit today is primarily to address immediate concerns and to rule out emergent cause of symptoms and that they may require further workup and evaluation as an outpatient. All questions addressed and patient given discharge instructions and followup information.           ED Management:  Norris Wilkerson  presents to  the emergency Department today with c/o epigastric abdominal pain.  Their workup today does not show any signs of acute abnormality which would require hospitalization.  No sign of cholecystitis, pancreatitis by labs. Appendicitis possible but unlikely given the pt's physical exam and lab findings. Unlikely to be an ileus or small bowel obstruction given the patient's presentation and physical exam and CT findings.  CT scan does show possibility of gallstones or sludge.  Likely this is what is causing the patient's pain.  I have discussed with him diet modification and symptom control.  I will discharge home with symptom control and follow up with General surgery as needed. No need for admission or emergent General surgery consultation at this time. The pt has remained afebrile here in the emergency department.  They are tolerating by mouth.  The patient is comfortable with this plan and comfortable going home at this time. After taking into careful account the historical factors and physical exam findings of the patient's presentation today, in conjunction with the empirical and objective data obtained on ED workup, no acute emergent medical condition has been identified. The patient appears to be low risk for an emergent medical condition and I feel it is safe and appropriate at this time for the patient to be discharged to follow-up as detailed in their discharge instructions for reevaluation and possible continued outpatient workup and management. I have discussed the specifics of the workup with the patient and the patient has verbalized understanding of the details of the workup, the diagnosis, the treatment plan, and the need for outpatient follow-up.  Although the patient has no emergent etiology today this does not preclude the development of an emergent condition so in addition, I have advised the patient that they can return to the ED and/or activate EMS at any time with worsening of their symptoms,  change of their symptoms, or with any other medical complaint.  The patient remained comfortable and stable during their visit in the ED.  Discharge and follow-up instructions discussed with the patient who expressed understanding and willingness to comply with my recommendations.     This medical record was prepared using voice dictation software. There may be phonetic errors.        Impression          The primary encounter diagnosis was Epigastric pain. A diagnosis of Elevated BP without diagnosis of hypertension was also pertinent to this visit.                New Prescriptions    HYDROCODONE-ACETAMINOPHEN (NORCO) 5-325 MG PER TABLET    Take 1 tablet by mouth every 6 (six) hours as needed for Pain.    ONDANSETRON (ZOFRAN-ODT) 4 MG TBDL    Take 1 tablet (4 mg total) by mouth every 6 (six) hours as needed (nausea or vomiting).          Follow-up Information     Schedule an appointment as soon as possible for a visit  with [facility] General Surgery.    Specialty: Surgery  Why: As needed  Contact information:  [address], [address] [address] [phone]  Additional information:  Please park in [address] or [address] and use [address]. Take Medical Office Bldg elevators.           Schedule an appointment as soon as possible for a visit  with Your PCP.                                Rut Cuenca MD  01/05/22 7464

## 2022-01-05 NOTE — Clinical Note
"Norris"Lisa Wilkerson was seen and treated in our emergency department on 1/5/2022.  He may return to work on 01/06/2022.       If you have any questions or concerns, please don't hesitate to call.      Rut Cuenca MD"

## 2022-01-05 NOTE — DISCHARGE INSTRUCTIONS
Additional instructions  Followup with your primary care physician. If you continue to have this pain you may need to see General Surgery for further evaluation see follow up section.  Take all your medications as prescribed. Return to the emergency department if you have increasing pain, chest pain, difficulty breathing,  nonstop vomiting, or any other concerns. Be sure to drink plenty of fluids to stay hydrated. Get plenty of rest. Please refer to additional educational material for further instructions.

## 2022-01-07 ENCOUNTER — OFFICE VISIT (OUTPATIENT)
Dept: SURGERY | Facility: CLINIC | Age: 52
End: 2022-01-07
Payer: COMMERCIAL

## 2022-01-07 VITALS
HEART RATE: 79 BPM | SYSTOLIC BLOOD PRESSURE: 110 MMHG | WEIGHT: 239 LBS | DIASTOLIC BLOOD PRESSURE: 78 MMHG | BODY MASS INDEX: 29.72 KG/M2 | HEIGHT: 75 IN

## 2022-01-07 DIAGNOSIS — Z01.818 PREOP TESTING: ICD-10-CM

## 2022-01-07 DIAGNOSIS — K80.50 BILIARY COLIC: Primary | ICD-10-CM

## 2022-01-07 PROCEDURE — 99999 PR PBB SHADOW E&M-EST. PATIENT-LVL IV: CPT | Mod: PBBFAC,,, | Performed by: STUDENT IN AN ORGANIZED HEALTH CARE EDUCATION/TRAINING PROGRAM

## 2022-01-07 PROCEDURE — 3074F PR MOST RECENT SYSTOLIC BLOOD PRESSURE < 130 MM HG: ICD-10-PCS | Mod: CPTII,S$GLB,, | Performed by: STUDENT IN AN ORGANIZED HEALTH CARE EDUCATION/TRAINING PROGRAM

## 2022-01-07 PROCEDURE — 1159F MED LIST DOCD IN RCRD: CPT | Mod: CPTII,S$GLB,, | Performed by: STUDENT IN AN ORGANIZED HEALTH CARE EDUCATION/TRAINING PROGRAM

## 2022-01-07 PROCEDURE — 1160F RVW MEDS BY RX/DR IN RCRD: CPT | Mod: CPTII,S$GLB,, | Performed by: STUDENT IN AN ORGANIZED HEALTH CARE EDUCATION/TRAINING PROGRAM

## 2022-01-07 PROCEDURE — 3078F PR MOST RECENT DIASTOLIC BLOOD PRESSURE < 80 MM HG: ICD-10-PCS | Mod: CPTII,S$GLB,, | Performed by: STUDENT IN AN ORGANIZED HEALTH CARE EDUCATION/TRAINING PROGRAM

## 2022-01-07 PROCEDURE — 99204 PR OFFICE/OUTPT VISIT, NEW, LEVL IV, 45-59 MIN: ICD-10-PCS | Mod: S$GLB,,, | Performed by: STUDENT IN AN ORGANIZED HEALTH CARE EDUCATION/TRAINING PROGRAM

## 2022-01-07 PROCEDURE — 99204 OFFICE O/P NEW MOD 45 MIN: CPT | Mod: S$GLB,,, | Performed by: STUDENT IN AN ORGANIZED HEALTH CARE EDUCATION/TRAINING PROGRAM

## 2022-01-07 PROCEDURE — 1159F PR MEDICATION LIST DOCUMENTED IN MEDICAL RECORD: ICD-10-PCS | Mod: CPTII,S$GLB,, | Performed by: STUDENT IN AN ORGANIZED HEALTH CARE EDUCATION/TRAINING PROGRAM

## 2022-01-07 PROCEDURE — 3078F DIAST BP <80 MM HG: CPT | Mod: CPTII,S$GLB,, | Performed by: STUDENT IN AN ORGANIZED HEALTH CARE EDUCATION/TRAINING PROGRAM

## 2022-01-07 PROCEDURE — 99999 PR PBB SHADOW E&M-EST. PATIENT-LVL IV: ICD-10-PCS | Mod: PBBFAC,,, | Performed by: STUDENT IN AN ORGANIZED HEALTH CARE EDUCATION/TRAINING PROGRAM

## 2022-01-07 PROCEDURE — 3074F SYST BP LT 130 MM HG: CPT | Mod: CPTII,S$GLB,, | Performed by: STUDENT IN AN ORGANIZED HEALTH CARE EDUCATION/TRAINING PROGRAM

## 2022-01-07 PROCEDURE — 3008F PR BODY MASS INDEX (BMI) DOCUMENTED: ICD-10-PCS | Mod: CPTII,S$GLB,, | Performed by: STUDENT IN AN ORGANIZED HEALTH CARE EDUCATION/TRAINING PROGRAM

## 2022-01-07 PROCEDURE — 3008F BODY MASS INDEX DOCD: CPT | Mod: CPTII,S$GLB,, | Performed by: STUDENT IN AN ORGANIZED HEALTH CARE EDUCATION/TRAINING PROGRAM

## 2022-01-07 PROCEDURE — 1160F PR REVIEW ALL MEDS BY PRESCRIBER/CLIN PHARMACIST DOCUMENTED: ICD-10-PCS | Mod: CPTII,S$GLB,, | Performed by: STUDENT IN AN ORGANIZED HEALTH CARE EDUCATION/TRAINING PROGRAM

## 2022-01-07 NOTE — PROGRESS NOTES
Patient ID: Norris Wilkerson is a 51 y.o. male.    Chief Complaint: No chief complaint on file.      HPI:  HPI   51M with several episodes of epigastric/RUQ pain over the past few weeks. Seems to be worsening, last episode 4 days ago sent him to ED. CT showed gallstones. His pain resolves each time after a few hours and he feels fine. Assocaited with some nausea, no vomiting. No ab sx.  Hasnt not taken any ibuprofen in over a year.     Review of Systems   Constitutional: Negative for chills, diaphoresis and fever.   HENT: Negative for trouble swallowing.    Respiratory: Negative for cough, shortness of breath, wheezing and stridor.    Cardiovascular: Negative for chest pain and palpitations.   Gastrointestinal: Positive for abdominal pain and nausea. Negative for abdominal distention, blood in stool, diarrhea and vomiting.   Endocrine: Negative for cold intolerance and heat intolerance.   Genitourinary: Negative for difficulty urinating.   Musculoskeletal: Negative for back pain.   Skin: Negative for rash.   Allergic/Immunologic: Negative for immunocompromised state.   Neurological: Negative for dizziness, syncope and numbness.   Hematological: Negative for adenopathy.   Psychiatric/Behavioral: Negative for agitation.       Current Outpatient Medications   Medication Sig Dispense Refill    sildenafil citrate (SILDENAFIL ORAL) Take by mouth.      benzonatate (TESSALON) 100 MG capsule Take 100 mg by mouth 3 (three) times daily as needed for Cough.      clindamycin (CLEOCIN) 300 MG capsule Take 1 capsule (300 mg total) by mouth every 8 (eight) hours. 30 capsule 0    diazepam (VALIUM) 5 MG tablet Take 1 tablet (5 mg total) by mouth every 6 (six) hours as needed (muscle spasm). 12 tablet 0    HYDROcodone-acetaminophen (NORCO) 5-325 mg per tablet Take 1 tablet by mouth every 6 (six) hours as needed for Pain. (Patient not taking: Reported on 1/7/2022) 12 tablet 0    ibuprofen (ADVIL,MOTRIN) 600 MG tablet Take 1 tablet  (600 mg total) by mouth every 6 (six) hours as needed for Pain. (Patient not taking: Reported on 7/20/2020) 20 tablet 0    lidocaine HCl 2% (LIDOCAINE VISCOUS) 2 % Soln by Mucous Membrane route every 3 (three) hours. 5-10 ml gargle and spit 100 mL 0    ondansetron (ZOFRAN-ODT) 4 MG TbDL Take 1 tablet (4 mg total) by mouth every 6 (six) hours as needed (nausea or vomiting). (Patient not taking: Reported on 1/7/2022) 20 tablet 0     No current facility-administered medications for this visit.       Review of patient's allergies indicates:  No Known Allergies    History reviewed. No pertinent past medical history.    Past Surgical History:   Procedure Laterality Date    NECK SURGERY         Family History   Problem Relation Age of Onset    Diabetes Mother     Hypertension Father     Parkinsonism Father        Social History     Socioeconomic History    Marital status: Single   Tobacco Use    Smoking status: Current Every Day Smoker     Packs/day: 0.50     Types: Cigarettes    Smokeless tobacco: Never Used   Substance and Sexual Activity    Alcohol use: No    Drug use: No       Vitals:    01/07/22 0758   BP: 110/78   Pulse: 79       Physical Exam  Constitutional:       General: He is not in acute distress.     Appearance: He is well-nourished.   HENT:      Head: Normocephalic and atraumatic.   Eyes:      General: No scleral icterus.  Cardiovascular:      Rate and Rhythm: Normal rate.   Pulmonary:      Effort: Pulmonary effort is normal. No respiratory distress.      Breath sounds: No stridor.   Abdominal:      Palpations: Abdomen is soft.      Tenderness: There is no abdominal tenderness.   Lymphadenopathy:      Cervical: No cervical adenopathy.   Skin:     General: Skin is warm.      Findings: No erythema.   Neurological:      Mental Status: He is alert and oriented to person, place, and time.   Psychiatric:         Mood and Affect: Mood and affect normal.         Behavior: Behavior normal.       CMP, lipase  normal  CT shows sludge, stones in gallbladder    Assessment & Plan:   51M with biliary colic  To OR for robot anabela jan 13

## 2022-01-10 ENCOUNTER — TELEPHONE (OUTPATIENT)
Dept: SURGERY | Facility: CLINIC | Age: 52
End: 2022-01-10
Payer: COMMERCIAL

## 2022-01-10 ENCOUNTER — LAB VISIT (OUTPATIENT)
Dept: FAMILY MEDICINE | Facility: CLINIC | Age: 52
End: 2022-01-10
Payer: COMMERCIAL

## 2022-01-10 DIAGNOSIS — Z01.818 PREOP TESTING: ICD-10-CM

## 2022-01-10 LAB
SARS-COV-2 RNA RESP QL NAA+PROBE: NOT DETECTED
SARS-COV-2- CYCLE NUMBER: NORMAL

## 2022-01-10 PROCEDURE — U0003 INFECTIOUS AGENT DETECTION BY NUCLEIC ACID (DNA OR RNA); SEVERE ACUTE RESPIRATORY SYNDROME CORONAVIRUS 2 (SARS-COV-2) (CORONAVIRUS DISEASE [COVID-19]), AMPLIFIED PROBE TECHNIQUE, MAKING USE OF HIGH THROUGHPUT TECHNOLOGIES AS DESCRIBED BY CMS-2020-01-R: HCPCS | Performed by: STUDENT IN AN ORGANIZED HEALTH CARE EDUCATION/TRAINING PROGRAM

## 2022-01-10 PROCEDURE — U0005 INFEC AGEN DETEC AMPLI PROBE: HCPCS | Performed by: STUDENT IN AN ORGANIZED HEALTH CARE EDUCATION/TRAINING PROGRAM

## 2022-01-10 NOTE — TELEPHONE ENCOUNTER
----- Message from Todd Ortega sent at 1/10/2022  9:31 AM CST -----  Contact: Pt  Type:  Patient Returning Call    Who Called:Pt  Would the patient rather a call back or a response via [facility]? call  Best Call Back Number:[phone]  Additional Information:     Pt stated he received a on his pt portal stating his insurance is out of network w/ his insurance  Pt would like a referral sent to [facility] to someone who is in network w/ insurance  Pt stated he would like same day 01/13 for procedure  Pt would like a call back     1/10/22  10:48am  Spoke to patient who confirmed he wants to cancel surgery with Dr. Bhatti due to insurance out of network.

## 2023-08-28 ENCOUNTER — PATIENT MESSAGE (OUTPATIENT)
Dept: DERMATOLOGY | Facility: CLINIC | Age: 53
End: 2023-08-28
Payer: COMMERCIAL

## 2023-08-28 ENCOUNTER — TELEPHONE (OUTPATIENT)
Dept: DERMATOLOGY | Facility: CLINIC | Age: 53
End: 2023-08-28
Payer: COMMERCIAL

## 2023-08-28 NOTE — TELEPHONE ENCOUNTER
Spoke to pt about rescheduling his 10/27 appointment. Pt stated he would call back when he had time to look over his calendar to reschedule.

## 2023-10-19 ENCOUNTER — HOSPITAL ENCOUNTER (EMERGENCY)
Facility: HOSPITAL | Age: 53
Discharge: HOME | End: 2023-10-19
Attending: EMERGENCY MEDICINE
Payer: COMMERCIAL

## 2023-10-19 VITALS
OXYGEN SATURATION: 99 % | WEIGHT: 245 LBS | HEIGHT: 75 IN | RESPIRATION RATE: 18 BRPM | HEART RATE: 99 BPM | SYSTOLIC BLOOD PRESSURE: 147 MMHG | DIASTOLIC BLOOD PRESSURE: 72 MMHG | TEMPERATURE: 99 F | BODY MASS INDEX: 30.46 KG/M2

## 2023-10-19 DIAGNOSIS — S82.832A CLOSED FRACTURE OF DISTAL END OF LEFT FIBULA, UNSPECIFIED FRACTURE MORPHOLOGY, INITIAL ENCOUNTER: Primary | ICD-10-CM

## 2023-10-19 DIAGNOSIS — S99.912A LEFT ANKLE INJURY, INITIAL ENCOUNTER: ICD-10-CM

## 2023-10-19 PROCEDURE — 99284 EMERGENCY DEPT VISIT MOD MDM: CPT | Mod: 25

## 2023-10-19 PROCEDURE — 29515 APPLICATION SHORT LEG SPLINT: CPT | Mod: LT

## 2023-10-19 PROCEDURE — 25000003 PHARM REV CODE 250: Performed by: EMERGENCY MEDICINE

## 2023-10-19 PROCEDURE — 63600175 PHARM REV CODE 636 W HCPCS: Performed by: EMERGENCY MEDICINE

## 2023-10-19 PROCEDURE — 96372 THER/PROPH/DIAG INJ SC/IM: CPT | Performed by: EMERGENCY MEDICINE

## 2023-10-19 RX ORDER — ACETAMINOPHEN 500 MG
1000 TABLET ORAL EVERY 6 HOURS PRN
Qty: 112 TABLET | Refills: 0 | Status: SHIPPED | OUTPATIENT
Start: 2023-10-19 | End: 2023-11-02

## 2023-10-19 RX ORDER — ACETAMINOPHEN 500 MG
1000 TABLET ORAL
Status: COMPLETED | OUTPATIENT
Start: 2023-10-19 | End: 2023-10-19

## 2023-10-19 RX ORDER — KETOROLAC TROMETHAMINE 30 MG/ML
15 INJECTION, SOLUTION INTRAMUSCULAR; INTRAVENOUS
Status: COMPLETED | OUTPATIENT
Start: 2023-10-19 | End: 2023-10-19

## 2023-10-19 RX ORDER — NAPROXEN 500 MG/1
500 TABLET ORAL 2 TIMES DAILY WITH MEALS
Qty: 28 TABLET | Refills: 0 | Status: SHIPPED | OUTPATIENT
Start: 2023-10-19 | End: 2023-11-02

## 2023-10-19 RX ADMIN — KETOROLAC TROMETHAMINE 15 MG: 30 INJECTION, SOLUTION INTRAMUSCULAR; INTRAVENOUS at 07:10

## 2023-10-19 RX ADMIN — ACETAMINOPHEN 1000 MG: 500 TABLET ORAL at 07:10

## 2023-10-19 NOTE — ED PROVIDER NOTES
Encounter Date: 10/19/2023       History     Chief Complaint   Patient presents with    Ankle Pain     Pt states that he was sitting on a porch swing last night when the swing collapsed and him and the swing fell on his left ankle. Pt states that due to increased pain he did not sleep and felt that it needs to be checked out.     53-year-old male with past medical history as below presents with complaint of left ankle pain.  Patient says that around 6:00 p.m. last night, he was sitting on a porch swing when 1 side broke, and he fell, with a swing lab landing on his left calf.  He says that his left ankle twisted and it has been painful and swollen since.  He says that he has been able to bear weight on it with pain.  Says he has not taken anything for the pain or swelling.      The history is provided by the patient. No  was used.     Review of patient's allergies indicates:  No Known Allergies  No past medical history on file.  Past Surgical History:   Procedure Laterality Date    NECK SURGERY       Family History   Problem Relation Age of Onset    Diabetes Mother     Hypertension Father     Parkinsonism Father      Social History     Tobacco Use    Smoking status: Every Day     Current packs/day: 0.50     Types: Cigarettes    Smokeless tobacco: Never   Substance Use Topics    Alcohol use: No    Drug use: No     Review of Systems    Physical Exam     Initial Vitals [10/19/23 0726]   BP Pulse Resp Temp SpO2   (!) 147/72 99 18 98.7 °F (37.1 °C) 99 %      MAP       --         Physical Exam    Constitutional: He appears well-developed and well-nourished. No distress.   HENT:   Head: Normocephalic and atraumatic.   Eyes: EOM are normal.   Neck:   Normal range of motion.  Cardiovascular:  Normal rate.           Pulmonary/Chest: No respiratory distress.   Abdominal: He exhibits no distension.   Musculoskeletal:         General: Normal range of motion.      Cervical back: Normal range of motion.       Comments: LLE: L ankle swollen and tender over lateral malleolus. Able to range ankle but significantly decreased 2/2 pain. No pain to palpation of entire foot. FROM with no pain of L knee. L calf with soft compartments. 2+ DP and PT pulses.      Neurological: He is alert and oriented to person, place, and time.   Skin: Skin is warm and dry.   Psychiatric: He has a normal mood and affect.         ED Course   Splint Application    Date/Time: 10/19/2023 8:47 AM    Performed by: Holli Borges MD  Authorized by: Holli Borges MD  Location details: left ankle  Splint type: short leg  Supplies used: Ortho-Glass, elastic bandage and cotton padding  Post-procedure: The splinted body part was neurovascularly unchanged following the procedure.  Patient tolerance: Patient tolerated the procedure well with no immediate complications        Labs Reviewed - No data to display       Imaging Results              X-Ray Ankle Complete Left (Final result)  Result time 10/19/23 08:16:41      Final result by Ronit White MD (10/19/23 08:16:41)                   Impression:      Nondisplaced fracture of the distal fibula (type B)    Linear lucency appears to traverse the distal cuboid on the lateral view, possibly a nondisplaced fracture versus an accessory ossicle, CT examination advised.      Electronically signed by: Ronit White MD  Date:    10/19/2023  Time:    08:16               Narrative:    EXAMINATION:  XR ANKLE COMPLETE 3 VIEW LEFT    CLINICAL HISTORY:  Unspecified injury of left ankle, initial encounter    TECHNIQUE:  AP, lateral and oblique views of the left ankle were performed.    COMPARISON:  None    FINDINGS:  Decreased mineralization.  The tibiotalar joint alignment is normal.  There is a nondisplaced fracture traversing the distal fibula (Ambrose-Wharton type B).  The medial malleolus and posterior tibial malleolus appear to be intact.    There is a linear lucency traversing what appears to  be the anterior inferior cuboid on the lateral view, an accessory ossicle is consider less likely, consider foot radiograph or CT of the ankle.  There is soft tissue edema especially overlying the lateral malleolus.  The remainder of the visualized osseous structures and soft tissues appear normal.                                       Medications   acetaminophen tablet 1,000 mg (1,000 mg Oral Given 10/19/23 0751)   ketorolac injection 15 mg (15 mg Intramuscular Given 10/19/23 0682)     Medical Decision Making  53-year-old male with past medical history as below presents with complaint of left ankle pain after mechanical fall with left ankle pain and swelling.  Differential includes but isn't limited to fracture, sprain, strain, contusion, dislocation.  X-ray notable for a nondisplaced left distal fibula fracture.  Patient placed in a splint.  Discussed pain control with patient, he feels his pain is a 2/10 while he is sitting on the stretcher at rest, says he only has pain with movement and declined prescription for narcotic therapy.  Referral placed for Podiatry.  Given crutches, strict return precautions and outpatient follow up.    No acute emergent medical condition has been identified. The patient appears to be low risk for an emergent medical condition is appropriate for discharge with outpatient f/u as detailed in discharge instructions for reevaluation and possible continued outpatient workup and management. I have discussed the workup with the patient, who has verbalized understanding of the plan and need for outpatient follow-up.  This evaluation does not preclude the development of an emergent condition so in addition, I have advised the patient that they can return to the ED at any time with worsening or change of their symptoms, or with any other medical complaint.       Amount and/or Complexity of Data Reviewed  External Data Reviewed: notes.     Details: Seen 1/5/22 for epigastric pain in the  ED  Radiology: ordered and independent interpretation performed.    Risk  OTC drugs.  Prescription drug management.                               Clinical Impression:   Final diagnoses:  [S96.571R] Left ankle injury, initial encounter  [S82.027X] Closed fracture of distal end of left fibula, unspecified fracture morphology, initial encounter (Primary)        ED Disposition Condition    Discharge Stable          ED Prescriptions       Medication Sig Dispense Start Date End Date Auth. Provider    acetaminophen (TYLENOL) 500 MG tablet Take 2 tablets (1,000 mg total) by mouth every 6 (six) hours as needed for Pain. 112 tablet 10/19/2023 11/2/2023 Holli Borges MD    naproxen (NAPROSYN) 500 MG tablet Take 1 tablet (500 mg total) by mouth 2 (two) times daily with meals. for 14 days 28 tablet 10/19/2023 11/2/2023 Holli Borges MD          Follow-up Information       Follow up With Specialties Details Why Contact Info Additional Information    [place] [facility] Schedule an appointment as soon as possible for a visit in 2 days  [address] [facility]  [address]  [facility] [address] [phone] Please park in [address] or [address] and use [address]. Take Medical Office Bldg elevators.    [place] - Emergency Dept Emergency Medicine  As needed, If symptoms worsen [address] [facility] [address] [phone]              Holli Borges MD  10/19/23 7684

## 2023-10-19 NOTE — DISCHARGE INSTRUCTIONS

## 2023-10-20 ENCOUNTER — OFFICE VISIT (OUTPATIENT)
Dept: PODIATRY | Facility: CLINIC | Age: 53
End: 2023-10-20
Payer: COMMERCIAL

## 2023-10-20 VITALS
BODY MASS INDEX: 30.46 KG/M2 | DIASTOLIC BLOOD PRESSURE: 93 MMHG | WEIGHT: 245 LBS | SYSTOLIC BLOOD PRESSURE: 132 MMHG | HEART RATE: 11 BPM | HEIGHT: 75 IN

## 2023-10-20 DIAGNOSIS — S82.892A CLOSED FRACTURE OF LEFT ANKLE, INITIAL ENCOUNTER: Primary | ICD-10-CM

## 2023-10-20 DIAGNOSIS — S82.832A CLOSED FRACTURE OF DISTAL END OF LEFT FIBULA, UNSPECIFIED FRACTURE MORPHOLOGY, INITIAL ENCOUNTER: ICD-10-CM

## 2023-10-20 PROCEDURE — 3080F PR MOST RECENT DIASTOLIC BLOOD PRESSURE >= 90 MM HG: ICD-10-PCS | Mod: CPTII,S$GLB,, | Performed by: STUDENT IN AN ORGANIZED HEALTH CARE EDUCATION/TRAINING PROGRAM

## 2023-10-20 PROCEDURE — 3080F DIAST BP >= 90 MM HG: CPT | Mod: CPTII,S$GLB,, | Performed by: STUDENT IN AN ORGANIZED HEALTH CARE EDUCATION/TRAINING PROGRAM

## 2023-10-20 PROCEDURE — 3008F PR BODY MASS INDEX (BMI) DOCUMENTED: ICD-10-PCS | Mod: CPTII,S$GLB,, | Performed by: STUDENT IN AN ORGANIZED HEALTH CARE EDUCATION/TRAINING PROGRAM

## 2023-10-20 PROCEDURE — 99203 OFFICE O/P NEW LOW 30 MIN: CPT | Mod: S$GLB,,, | Performed by: STUDENT IN AN ORGANIZED HEALTH CARE EDUCATION/TRAINING PROGRAM

## 2023-10-20 PROCEDURE — 99203 PR OFFICE/OUTPT VISIT, NEW, LEVL III, 30-44 MIN: ICD-10-PCS | Mod: S$GLB,,, | Performed by: STUDENT IN AN ORGANIZED HEALTH CARE EDUCATION/TRAINING PROGRAM

## 2023-10-20 PROCEDURE — 3008F BODY MASS INDEX DOCD: CPT | Mod: CPTII,S$GLB,, | Performed by: STUDENT IN AN ORGANIZED HEALTH CARE EDUCATION/TRAINING PROGRAM

## 2023-10-20 PROCEDURE — 3075F PR MOST RECENT SYSTOLIC BLOOD PRESS GE 130-139MM HG: ICD-10-PCS | Mod: CPTII,S$GLB,, | Performed by: STUDENT IN AN ORGANIZED HEALTH CARE EDUCATION/TRAINING PROGRAM

## 2023-10-20 PROCEDURE — 3075F SYST BP GE 130 - 139MM HG: CPT | Mod: CPTII,S$GLB,, | Performed by: STUDENT IN AN ORGANIZED HEALTH CARE EDUCATION/TRAINING PROGRAM

## 2023-10-20 PROCEDURE — 1159F MED LIST DOCD IN RCRD: CPT | Mod: CPTII,S$GLB,, | Performed by: STUDENT IN AN ORGANIZED HEALTH CARE EDUCATION/TRAINING PROGRAM

## 2023-10-20 PROCEDURE — 99999 PR PBB SHADOW E&M-EST. PATIENT-LVL IV: CPT | Mod: PBBFAC,,, | Performed by: STUDENT IN AN ORGANIZED HEALTH CARE EDUCATION/TRAINING PROGRAM

## 2023-10-20 PROCEDURE — 1159F PR MEDICATION LIST DOCUMENTED IN MEDICAL RECORD: ICD-10-PCS | Mod: CPTII,S$GLB,, | Performed by: STUDENT IN AN ORGANIZED HEALTH CARE EDUCATION/TRAINING PROGRAM

## 2023-10-20 PROCEDURE — 99999 PR PBB SHADOW E&M-EST. PATIENT-LVL IV: ICD-10-PCS | Mod: PBBFAC,,, | Performed by: STUDENT IN AN ORGANIZED HEALTH CARE EDUCATION/TRAINING PROGRAM

## 2023-10-20 RX ORDER — TRAMADOL HYDROCHLORIDE 50 MG/1
50 TABLET ORAL EVERY 8 HOURS PRN
Qty: 21 TABLET | Refills: 0 | Status: SHIPPED | OUTPATIENT
Start: 2023-10-20

## 2023-10-20 NOTE — PROGRESS NOTES
Subjective:     Patient ID: Norris Wilkerson is a 53 y.o. male.    Chief Complaint: Ankle Injury (Crush injury related to porch swing landing on foot/ankle)    Norris is a 53 y.o. male who presents to the podiatry clinic  with complaint of  left foot pain. Onset of the symptoms was  2 days ago . Precipitating event: injured left ankle, porch swing fell on his foot . Current symptoms include: inability to bear weight. Aggravating factors: any weight bearing. Symptoms have gradually improved. Patient has had no prior foot problems. Evaluation to date: plain films: abnormal Wharton B fracture to distal fibula, non displaced, and possible fracture to cuboid. Tibiotalar joint alignment is normal. Treatment to date: ice, rest, and boot . Patients rates pain 6/10 on pain scale.    Review of Systems   Constitutional: Negative for chills, decreased appetite, diaphoresis and fever.   HENT:  Negative for congestion and hearing loss.    Cardiovascular:  Negative for chest pain, claudication, leg swelling and syncope.   Respiratory:  Negative for cough and shortness of breath.    Skin:  Negative for color change, dry skin, flushing, itching, nail changes, poor wound healing and rash.   Musculoskeletal:  Negative for arthritis, back pain, joint pain and joint swelling.   Gastrointestinal:  Negative for nausea and vomiting.   Neurological:  Negative for focal weakness, paresthesias and weakness.   Psychiatric/Behavioral:  Negative for altered mental status. The patient is not nervous/anxious.         Objective:     Physical Exam  Constitutional:       General: He is not in acute distress.     Appearance: Normal appearance. He is well-developed. He is not diaphoretic.   Cardiovascular:      Comments: Dorsalis pedis and posterior tibial pulses are within normal limits. Skin temperature is within normal limits. Toes are cool to touch and feet are warm proximally. Hair growth is within normal limits. Skin is normotrophic and without  hyperpigmentation. No edema noted. No varicosities or spider veins noted, bilaterally.   Musculoskeletal:      Comments: Adequate joint range of motion without pain, limitation, nor crepitation to foot and ankle joints. Muscle strength is 5/5 in all groups bilaterally.    Tenderness to left distal fibula, ROM limited secondary to guarding of ankle joint. Site with localized swelling noted and bruising to heel and toe. No open wounds or signs of infection       Feet:      Right foot:      Skin integrity: Dry skin present. No ulcer, blister, erythema or warmth.      Left foot:      Skin integrity: Dry skin present. No ulcer, blister, erythema or warmth.   Skin:     General: Skin is warm and dry.      Capillary Refill: Capillary refill takes less than 2 seconds.      Comments: Skin is warm and dry, no acute signs of infection noted bilaterally      Toenails are well trimmed and of normal morphology    Otherwise, no open wounds, macerations or hyperkeratotic lesions, bilaterally            Neurological:      Mental Status: He is alert and oriented to person, place, and time.      Motor: No abnormal muscle tone.      Comments: Light touch within normal limits.    Psychiatric:         Mood and Affect: Mood normal.         Behavior: Behavior normal.         Thought Content: Thought content normal.           Assessment:      Encounter Diagnosis   Name Primary?    Closed fracture of left ankle, initial encounter Yes     Plan:     Norris was seen today for ankle injury.    Diagnoses and all orders for this visit:    Closed fracture of left ankle, initial encounter  -     CT Foot Without Contrast Left; Future  -     CT Ankle (Including Hindfoot) Without Contrast Left; Future    Other orders  -     traMADoL (ULTRAM) 50 mg tablet; Take 1 tablet (50 mg total) by mouth every 8 (eight) hours as needed for Pain.      I counseled the patient on his conditions, their implications and medical management.  Xray reviewed, CT ordered for  further evaluation. Discussed fracture is non displaced, tibiotalar joint alignment is normal, will continue with conservative care.    Continue CAM boot, NWB with crutches  ABBI, OTC NSAIDs PRN pain  Return to clinic in 1 mo, sooner PRN

## 2023-10-23 ENCOUNTER — HOSPITAL ENCOUNTER (OUTPATIENT)
Dept: RADIOLOGY | Facility: HOSPITAL | Age: 53
Discharge: HOME | End: 2023-10-23
Attending: STUDENT IN AN ORGANIZED HEALTH CARE EDUCATION/TRAINING PROGRAM
Payer: COMMERCIAL

## 2023-10-23 ENCOUNTER — TELEPHONE (OUTPATIENT)
Dept: PODIATRY | Facility: CLINIC | Age: 53
End: 2023-10-23
Payer: COMMERCIAL

## 2023-10-23 DIAGNOSIS — S82.892A CLOSED FRACTURE OF LEFT ANKLE, INITIAL ENCOUNTER: ICD-10-CM

## 2023-10-23 PROCEDURE — 73700 CT LOWER EXTREMITY W/O DYE: CPT | Mod: 26,LT,, | Performed by: INTERNAL MEDICINE

## 2023-10-23 PROCEDURE — 73700 CT LOWER EXTREMITY W/O DYE: CPT | Mod: TC,LT

## 2023-10-23 PROCEDURE — 73700 CT ANKLE (INCLUDING HINDFOOT) WITHOUT CONTRAST LEFT: ICD-10-PCS | Mod: 26,LT,, | Performed by: INTERNAL MEDICINE

## 2023-10-23 NOTE — TELEPHONE ENCOUNTER
Per message from Shruti, mother showed up at the clinic in [place] and ask for a letter to go back to work. Shruti wrote it and handed it to the mother

## 2023-10-23 NOTE — TELEPHONE ENCOUNTER
----- Message from Trina Ibrahim sent at 10/23/2023  8:29 AM CDT -----  Regarding: note for work  Contact: [phone]  Pt Norris is calling. Pt is calling because he needs a letter for work to be able to go back to work. Please give him a call back soon.

## 2023-10-23 NOTE — TELEPHONE ENCOUNTER
Received Mr Rock mother in Podiatry Clinic who reports she is here to  a return to work letter. Return to work letter drafted and given to patient's mother. Did encourage that if further assistance is needed in regard to work letter to please reach out to Podiatry Team. No other needs voiced per pt's mother

## 2023-10-23 NOTE — TELEPHONE ENCOUNTER
----- Message from Neelam Hernandez sent at 10/23/2023 10:46 AM CDT -----  Type: General Call Back     Name of Caller:Pts mother   Symptoms:documentation  Would the patient rather a call back or a response via [facility]? Call back   Best Call Back Number:[phone]  Additional Information: Patients mother called on behalf of the patient. Patient is trying to get his documentation to provide his employer in order to return to work. Patients mother indicates she would like to speak to someone directly in office in order to make sure she can pick it up. Pleased call back with further assistance as soon as possible.

## 2023-11-17 ENCOUNTER — OFFICE VISIT (OUTPATIENT)
Dept: PODIATRY | Facility: CLINIC | Age: 53
End: 2023-11-17
Payer: COMMERCIAL

## 2023-11-17 ENCOUNTER — HOSPITAL ENCOUNTER (OUTPATIENT)
Dept: RADIOLOGY | Facility: HOSPITAL | Age: 53
Discharge: HOME | End: 2023-11-17
Attending: STUDENT IN AN ORGANIZED HEALTH CARE EDUCATION/TRAINING PROGRAM
Payer: COMMERCIAL

## 2023-11-17 VITALS
HEART RATE: 80 BPM | DIASTOLIC BLOOD PRESSURE: 79 MMHG | SYSTOLIC BLOOD PRESSURE: 128 MMHG | BODY MASS INDEX: 30.62 KG/M2 | HEIGHT: 75 IN

## 2023-11-17 DIAGNOSIS — S82.892D CLOSED FRACTURE OF LEFT ANKLE WITH ROUTINE HEALING, SUBSEQUENT ENCOUNTER: ICD-10-CM

## 2023-11-17 DIAGNOSIS — S82.892D CLOSED FRACTURE OF LEFT ANKLE WITH ROUTINE HEALING, SUBSEQUENT ENCOUNTER: Primary | ICD-10-CM

## 2023-11-17 PROCEDURE — 73610 XR ANKLE COMPLETE 3 VIEW LEFT: ICD-10-PCS | Mod: 26,LT,, | Performed by: RADIOLOGY

## 2023-11-17 PROCEDURE — 73610 X-RAY EXAM OF ANKLE: CPT | Mod: TC,PN,LT

## 2023-11-17 PROCEDURE — 99999 PR PBB SHADOW E&M-EST. PATIENT-LVL III: CPT | Mod: PBBFAC,,, | Performed by: STUDENT IN AN ORGANIZED HEALTH CARE EDUCATION/TRAINING PROGRAM

## 2023-11-17 PROCEDURE — 3008F PR BODY MASS INDEX (BMI) DOCUMENTED: ICD-10-PCS | Mod: CPTII,S$GLB,, | Performed by: STUDENT IN AN ORGANIZED HEALTH CARE EDUCATION/TRAINING PROGRAM

## 2023-11-17 PROCEDURE — 99214 PR OFFICE/OUTPT VISIT, EST, LEVL IV, 30-39 MIN: ICD-10-PCS | Mod: S$GLB,,, | Performed by: STUDENT IN AN ORGANIZED HEALTH CARE EDUCATION/TRAINING PROGRAM

## 2023-11-17 PROCEDURE — 3078F DIAST BP <80 MM HG: CPT | Mod: CPTII,S$GLB,, | Performed by: STUDENT IN AN ORGANIZED HEALTH CARE EDUCATION/TRAINING PROGRAM

## 2023-11-17 PROCEDURE — 3074F PR MOST RECENT SYSTOLIC BLOOD PRESSURE < 130 MM HG: ICD-10-PCS | Mod: CPTII,S$GLB,, | Performed by: STUDENT IN AN ORGANIZED HEALTH CARE EDUCATION/TRAINING PROGRAM

## 2023-11-17 PROCEDURE — 3074F SYST BP LT 130 MM HG: CPT | Mod: CPTII,S$GLB,, | Performed by: STUDENT IN AN ORGANIZED HEALTH CARE EDUCATION/TRAINING PROGRAM

## 2023-11-17 PROCEDURE — 99999 PR PBB SHADOW E&M-EST. PATIENT-LVL III: ICD-10-PCS | Mod: PBBFAC,,, | Performed by: STUDENT IN AN ORGANIZED HEALTH CARE EDUCATION/TRAINING PROGRAM

## 2023-11-17 PROCEDURE — 73610 X-RAY EXAM OF ANKLE: CPT | Mod: 26,LT,, | Performed by: RADIOLOGY

## 2023-11-17 PROCEDURE — 99214 OFFICE O/P EST MOD 30 MIN: CPT | Mod: S$GLB,,, | Performed by: STUDENT IN AN ORGANIZED HEALTH CARE EDUCATION/TRAINING PROGRAM

## 2023-11-17 PROCEDURE — 3008F BODY MASS INDEX DOCD: CPT | Mod: CPTII,S$GLB,, | Performed by: STUDENT IN AN ORGANIZED HEALTH CARE EDUCATION/TRAINING PROGRAM

## 2023-11-17 PROCEDURE — 3078F PR MOST RECENT DIASTOLIC BLOOD PRESSURE < 80 MM HG: ICD-10-PCS | Mod: CPTII,S$GLB,, | Performed by: STUDENT IN AN ORGANIZED HEALTH CARE EDUCATION/TRAINING PROGRAM

## 2023-11-17 NOTE — PROGRESS NOTES
Subjective:     Patient ID: Norris Wilkerson is a 53 y.o. male.    Chief Complaint: Foot Problem (Left ft., is lightly swollen), Foot Pain, and Follow-up    Norris is a 53 y.o. male who presents to the podiatry clinic  with complaint of  left foot pain. Onset of the symptoms was  2 days ago . Precipitating event: injured left ankle, porch swing fell on his foot . Current symptoms include: inability to bear weight. Aggravating factors: any weight bearing. Symptoms have gradually improved. Patient has had no prior foot problems. Evaluation to date: plain films: abnormal Wharton B fracture to distal fibula, non displaced, and possible fracture to cuboid. Tibiotalar joint alignment is normal. Treatment to date: ice, rest, and boot . Patients rates pain 6/10 on pain scale.    11/17/23: Seen today, benjy has been non weightbearing for 1 month and in boot. He is eager to get back to his job. Relates to pain when he steps down on foot while in boot. No further pedal complaints.     Review of Systems   Constitutional: Negative for chills, decreased appetite, diaphoresis and fever.   HENT:  Negative for congestion and hearing loss.    Cardiovascular:  Negative for chest pain, claudication, leg swelling and syncope.   Respiratory:  Negative for cough and shortness of breath.    Skin:  Negative for color change, dry skin, flushing, itching, nail changes, poor wound healing and rash.   Musculoskeletal:  Negative for arthritis, back pain, joint pain and joint swelling.   Gastrointestinal:  Negative for nausea and vomiting.   Neurological:  Negative for focal weakness, paresthesias and weakness.   Psychiatric/Behavioral:  Negative for altered mental status. The patient is not nervous/anxious.         Objective:     Physical Exam  Constitutional:       General: He is not in acute distress.     Appearance: Normal appearance. He is well-developed. He is not diaphoretic.   Cardiovascular:      Comments: Dorsalis pedis and posterior tibial  pulses are within normal limits. Skin temperature is within normal limits. Toes are cool to touch and feet are warm proximally. Hair growth is within normal limits. Skin is normotrophic and without hyperpigmentation. No edema noted. No varicosities or spider veins noted, bilaterally.   Musculoskeletal:      Comments: Adequate joint range of motion without pain, limitation, nor crepitation to foot and ankle joints. Muscle strength is 5/5 in all groups bilaterally.    Tenderness to left distal fibula, ROM WNL. Resolved localized swelling and resolved bruising to heel and toe. No open wounds or signs of infection       Feet:      Right foot:      Skin integrity: Dry skin present. No ulcer, blister, erythema or warmth.      Left foot:      Skin integrity: Dry skin present. No ulcer, blister, erythema or warmth.   Skin:     General: Skin is warm and dry.      Capillary Refill: Capillary refill takes less than 2 seconds.      Comments: Skin is warm and dry, no acute signs of infection noted bilaterally      Toenails are well trimmed and of normal morphology    Otherwise, no open wounds, macerations or hyperkeratotic lesions, bilaterally            Neurological:      Mental Status: He is alert and oriented to person, place, and time.      Motor: No abnormal muscle tone.      Comments: Light touch within normal limits.    Psychiatric:         Mood and Affect: Mood normal.         Behavior: Behavior normal.         Thought Content: Thought content normal.           Assessment:      Encounter Diagnosis   Name Primary?    Closed fracture of left ankle with routine healing, subsequent encounter Yes       Plan:     Norris was seen today for foot problem, foot pain and follow-up.    Diagnoses and all orders for this visit:    Closed fracture of left ankle with routine healing, subsequent encounter  -     X-Ray Ankle Complete Left; Future        I counseled the patient on his conditions, their implications and medical  management.  Xray independently reviewed with patient noting incomplete healing. Discussed fractures take up to 12 weeks for full healing  Recommend NWB for 2-4 more weeks, may slowly bear weight afterward, protected in boot, as tolerated   Continue CAM boot until follow up  RICE, OTC NSAIDs PRN pain  Consider vitamin D supplement for bone healing  Return to clinic in 6-8 weeks, sooner PRN

## 2023-12-21 ENCOUNTER — HOSPITAL ENCOUNTER (OUTPATIENT)
Dept: RADIOLOGY | Facility: HOSPITAL | Age: 53
Discharge: HOME | End: 2023-12-21
Attending: STUDENT IN AN ORGANIZED HEALTH CARE EDUCATION/TRAINING PROGRAM
Payer: COMMERCIAL

## 2023-12-21 ENCOUNTER — OFFICE VISIT (OUTPATIENT)
Dept: PODIATRY | Facility: CLINIC | Age: 53
End: 2023-12-21
Payer: COMMERCIAL

## 2023-12-21 VITALS
WEIGHT: 245 LBS | SYSTOLIC BLOOD PRESSURE: 137 MMHG | HEIGHT: 75 IN | BODY MASS INDEX: 30.46 KG/M2 | HEART RATE: 84 BPM | DIASTOLIC BLOOD PRESSURE: 94 MMHG

## 2023-12-21 DIAGNOSIS — S82.892D CLOSED FRACTURE OF LEFT ANKLE WITH ROUTINE HEALING, SUBSEQUENT ENCOUNTER: ICD-10-CM

## 2023-12-21 DIAGNOSIS — S82.892D CLOSED FRACTURE OF LEFT ANKLE WITH ROUTINE HEALING, SUBSEQUENT ENCOUNTER: Primary | ICD-10-CM

## 2023-12-21 PROCEDURE — 73610 X-RAY EXAM OF ANKLE: CPT | Mod: 26,LT,, | Performed by: RADIOLOGY

## 2023-12-21 PROCEDURE — 3075F SYST BP GE 130 - 139MM HG: CPT | Mod: CPTII,S$GLB,, | Performed by: STUDENT IN AN ORGANIZED HEALTH CARE EDUCATION/TRAINING PROGRAM

## 2023-12-21 PROCEDURE — 99214 OFFICE O/P EST MOD 30 MIN: CPT | Mod: S$GLB,,, | Performed by: STUDENT IN AN ORGANIZED HEALTH CARE EDUCATION/TRAINING PROGRAM

## 2023-12-21 PROCEDURE — 3008F BODY MASS INDEX DOCD: CPT | Mod: CPTII,S$GLB,, | Performed by: STUDENT IN AN ORGANIZED HEALTH CARE EDUCATION/TRAINING PROGRAM

## 2023-12-21 PROCEDURE — 99214 PR OFFICE/OUTPT VISIT, EST, LEVL IV, 30-39 MIN: ICD-10-PCS | Mod: S$GLB,,, | Performed by: STUDENT IN AN ORGANIZED HEALTH CARE EDUCATION/TRAINING PROGRAM

## 2023-12-21 PROCEDURE — 3008F PR BODY MASS INDEX (BMI) DOCUMENTED: ICD-10-PCS | Mod: CPTII,S$GLB,, | Performed by: STUDENT IN AN ORGANIZED HEALTH CARE EDUCATION/TRAINING PROGRAM

## 2023-12-21 PROCEDURE — 3080F DIAST BP >= 90 MM HG: CPT | Mod: CPTII,S$GLB,, | Performed by: STUDENT IN AN ORGANIZED HEALTH CARE EDUCATION/TRAINING PROGRAM

## 2023-12-21 PROCEDURE — 99999 PR PBB SHADOW E&M-EST. PATIENT-LVL IV: ICD-10-PCS | Mod: PBBFAC,,, | Performed by: STUDENT IN AN ORGANIZED HEALTH CARE EDUCATION/TRAINING PROGRAM

## 2023-12-21 PROCEDURE — 99999 PR PBB SHADOW E&M-EST. PATIENT-LVL IV: CPT | Mod: PBBFAC,,, | Performed by: STUDENT IN AN ORGANIZED HEALTH CARE EDUCATION/TRAINING PROGRAM

## 2023-12-21 PROCEDURE — 73610 X-RAY EXAM OF ANKLE: CPT | Mod: TC,PN,LT

## 2023-12-21 PROCEDURE — 1159F PR MEDICATION LIST DOCUMENTED IN MEDICAL RECORD: ICD-10-PCS | Mod: CPTII,S$GLB,, | Performed by: STUDENT IN AN ORGANIZED HEALTH CARE EDUCATION/TRAINING PROGRAM

## 2023-12-21 PROCEDURE — 1159F MED LIST DOCD IN RCRD: CPT | Mod: CPTII,S$GLB,, | Performed by: STUDENT IN AN ORGANIZED HEALTH CARE EDUCATION/TRAINING PROGRAM

## 2023-12-21 PROCEDURE — 3075F PR MOST RECENT SYSTOLIC BLOOD PRESS GE 130-139MM HG: ICD-10-PCS | Mod: CPTII,S$GLB,, | Performed by: STUDENT IN AN ORGANIZED HEALTH CARE EDUCATION/TRAINING PROGRAM

## 2023-12-21 PROCEDURE — 73610 XR ANKLE COMPLETE 3 VIEW LEFT: ICD-10-PCS | Mod: 26,LT,, | Performed by: RADIOLOGY

## 2023-12-21 PROCEDURE — 3080F PR MOST RECENT DIASTOLIC BLOOD PRESSURE >= 90 MM HG: ICD-10-PCS | Mod: CPTII,S$GLB,, | Performed by: STUDENT IN AN ORGANIZED HEALTH CARE EDUCATION/TRAINING PROGRAM

## 2023-12-21 NOTE — PROGRESS NOTES
Subjective:     Patient ID: Norris Wilkerson is a 53 y.o. male.    Chief Complaint: Ankle Injury (F/U left ankle injury (Oct 19 2023))    Norris is a 53 y.o. male who presents to the podiatry clinic  with complaint of  left foot pain. Onset of the symptoms was  2 days ago . Precipitating event: injured left ankle, porch swing fell on his foot . Current symptoms include: inability to bear weight. Aggravating factors: any weight bearing. Symptoms have gradually improved. Patient has had no prior foot problems. Evaluation to date: plain films: abnormal Wharton B fracture to distal fibula, non displaced, and possible fracture to cuboid. Tibiotalar joint alignment is normal. Treatment to date: ice, rest, and boot . Patients rates pain 6/10 on pain scale.    11/17/23: Seen today, benjy has been non weightbearing for 1 month and in boot. He is eager to get back to his job. Relates to pain when he steps down on foot while in boot. No further pedal complaints.     12/21/23: Seen today for follow up    Review of Systems   Constitutional: Negative for chills, decreased appetite, diaphoresis and fever.   HENT:  Negative for congestion and hearing loss.    Cardiovascular:  Negative for chest pain, claudication, leg swelling and syncope.   Respiratory:  Negative for cough and shortness of breath.    Skin:  Negative for color change, dry skin, flushing, itching, nail changes, poor wound healing and rash.   Musculoskeletal:  Negative for arthritis, back pain, joint pain and joint swelling.   Gastrointestinal:  Negative for nausea and vomiting.   Neurological:  Negative for focal weakness, paresthesias and weakness.   Psychiatric/Behavioral:  Negative for altered mental status. The patient is not nervous/anxious.         Objective:     Physical Exam  Constitutional:       General: He is not in acute distress.     Appearance: Normal appearance. He is well-developed. He is not diaphoretic.   Cardiovascular:      Comments: Dorsalis pedis and  posterior tibial pulses are within normal limits. Skin temperature is within normal limits. Toes are cool to touch and feet are warm proximally. Hair growth is within normal limits. Skin is normotrophic and without hyperpigmentation. No edema noted. No varicosities or spider veins noted, bilaterally.   Musculoskeletal:         General: No tenderness.      Comments: Adequate joint range of motion without pain, limitation, nor crepitation to foot and ankle joints. Muscle strength is 5/5 in all groups bilaterally.    No tenderness to left distal fibula, ROM WNL. Resolved localized swelling and resolved bruising to heel and toe. No open wounds or signs of infection       Feet:      Right foot:      Skin integrity: Dry skin present. No ulcer, blister, erythema or warmth.      Left foot:      Skin integrity: Dry skin present. No ulcer, blister, erythema or warmth.   Skin:     General: Skin is warm and dry.      Capillary Refill: Capillary refill takes less than 2 seconds.      Comments: Skin is warm and dry, no acute signs of infection noted bilaterally      Toenails are well trimmed and of normal morphology    Otherwise, no open wounds, macerations or hyperkeratotic lesions, bilaterally            Neurological:      Mental Status: He is alert and oriented to person, place, and time.      Motor: No abnormal muscle tone.      Comments: Light touch within normal limits.    Psychiatric:         Mood and Affect: Mood normal.         Behavior: Behavior normal.         Thought Content: Thought content normal.           Assessment:      Encounter Diagnosis   Name Primary?    Closed fracture of left ankle with routine healing, subsequent encounter Yes         Plan:     Norris was seen today for ankle injury.    Diagnoses and all orders for this visit:    Closed fracture of left ankle with routine healing, subsequent encounter  -     X-Ray Ankle Complete Left; Future          I counseled the patient on his conditions, their  implications and medical management.  Xray independently reviewed with patient noting bone healing with bony callus formation. Osseous bone bridging noted across fracture site   Recommend continue boot for 2-4 more weeks, may slowly transition out of boot to supportive tennis shoes as tolerated   Strengthening exercises dispensed, recommend follow up with PT   ABBI, OTC NSAIDs PRN pain  Continue vitamin D supplement for bone healing  Return to clinic PRN

## 2024-11-04 ENCOUNTER — PATIENT MESSAGE (OUTPATIENT)
Dept: RESEARCH | Facility: HOSPITAL | Age: 54
End: 2024-11-04
Payer: COMMERCIAL

## 2024-12-09 ENCOUNTER — OFFICE VISIT (OUTPATIENT)
Dept: INTERNAL MEDICINE | Facility: CLINIC | Age: 54
End: 2024-12-09
Payer: COMMERCIAL

## 2024-12-09 VITALS
DIASTOLIC BLOOD PRESSURE: 90 MMHG | SYSTOLIC BLOOD PRESSURE: 124 MMHG | HEART RATE: 87 BPM | HEIGHT: 75 IN | OXYGEN SATURATION: 96 % | WEIGHT: 240.5 LBS | RESPIRATION RATE: 18 BRPM | TEMPERATURE: 98 F | BODY MASS INDEX: 29.9 KG/M2

## 2024-12-09 DIAGNOSIS — Z12.11 ENCOUNTER FOR SCREENING COLONOSCOPY: ICD-10-CM

## 2024-12-09 DIAGNOSIS — K58.9 IRRITABLE BOWEL SYNDROME, UNSPECIFIED TYPE: ICD-10-CM

## 2024-12-09 DIAGNOSIS — R91.1 SOLITARY PULMONARY NODULE: ICD-10-CM

## 2024-12-09 DIAGNOSIS — Z72.0 TOBACCO USER: ICD-10-CM

## 2024-12-09 DIAGNOSIS — Z76.89 ENCOUNTER TO ESTABLISH CARE: Primary | ICD-10-CM

## 2024-12-09 DIAGNOSIS — R19.8 TENESMUS: ICD-10-CM

## 2024-12-09 PROBLEM — R91.8 MULTIPLE PULMONARY NODULES: Status: ACTIVE | Noted: 2024-12-09

## 2024-12-09 PROBLEM — J44.9 CHRONIC OBSTRUCTIVE PULMONARY DISEASE: Status: RESOLVED | Noted: 2024-01-02 | Resolved: 2024-12-09

## 2024-12-09 PROBLEM — J44.9 CHRONIC OBSTRUCTIVE PULMONARY DISEASE: Status: ACTIVE | Noted: 2024-01-02

## 2024-12-09 PROBLEM — E66.9 OBESITY: Status: ACTIVE | Noted: 2024-12-09

## 2024-12-09 PROCEDURE — 99214 OFFICE O/P EST MOD 30 MIN: CPT | Mod: S$GLB,,,

## 2024-12-09 PROCEDURE — 99999 PR PBB SHADOW E&M-EST. PATIENT-LVL V: CPT | Mod: PBBFAC,,,

## 2024-12-09 PROCEDURE — 1159F MED LIST DOCD IN RCRD: CPT | Mod: CPTII,S$GLB,,

## 2024-12-09 PROCEDURE — 1160F RVW MEDS BY RX/DR IN RCRD: CPT | Mod: CPTII,S$GLB,,

## 2024-12-09 PROCEDURE — 3080F DIAST BP >= 90 MM HG: CPT | Mod: CPTII,S$GLB,,

## 2024-12-09 PROCEDURE — 3008F BODY MASS INDEX DOCD: CPT | Mod: CPTII,S$GLB,,

## 2024-12-09 PROCEDURE — 3074F SYST BP LT 130 MM HG: CPT | Mod: CPTII,S$GLB,,

## 2024-12-09 RX ORDER — VARENICLINE TARTRATE 0.5 (11)-1
KIT ORAL
Qty: 1 EACH | Refills: 0 | Status: SHIPPED | OUTPATIENT
Start: 2024-12-09

## 2024-12-09 NOTE — PROGRESS NOTES
"Norris Wilkerson  1970        Subjective     Chief Complaint: Establish Care      History of Present Illness:  Mr. Norris Wilkerson is a 54 y.o. male who presents to clinic for establishing care.         C-scope: not utd      History of Present Illness    CHIEF COMPLAINT:  Mr. Wilkerson presents today for follow-up and concerns about frequent urges for bowel movements.    GASTROINTESTINAL:  He reports frequent urges for bowel movements, often at inconvenient times. These urges frequently result in no actual bowel movement. When bowel movements do occur, the stools are loose. He denies blood in the stool, bloating, or nausea.    SURGICAL HISTORY:  He underwent gallbladder removal in January 2022 and neck surgery with bone fusion over 10 years ago.    DIET AND LIFESTYLE:  He describes himself as a 'foodie' who eats out approximately three nights per week. He typically only consumes coffee for breakfast. He acknowledges his diet is not particularly healthy, focusing on foods he considers "good."    MEDICATIONS:  He takes sildenafil a couple times per week, which he has been using for years. He denies any new regular medications. Approximately six weeks ago, he used an inhaler, steroid injection, Z-Pato, and cough medication prescribed at an urgent care facility for short-term use during an illness with flu-like symptoms and cough.    SMOKING HISTORY:  He is a current smoker, consuming approximately one pack of cigarettes every four days. He attempted to quit smoking using Chantix over 10 years ago, which was partially successful for a short period. The quit attempt was prompted by medical necessity following neck surgery.    SLEEP:  He reports significant sleep issues, experiencing difficulty falling asleep due to racing thoughts. He only got four hours of sleep the previous night and feels very tired during the day despite his inability to sleep at night.    PULMONARY:  He has a history of lung nodules discovered " approximately 7 years ago. Follow-up scans were initially performed 3 times per year, gradually decreasing to annual scans due to absence of growth. The nodules are now believed to be scar tissue. Continued monitoring has been recommended due to his active smoking status. He denies any new symptoms or concerns related to the lung nodules.      ROS:  Constitutional: +fatigue  Respiratory: -cough  Gastrointestinal: -nausea, -bloating, +change in bowel habits, -hematochezia or melena  Psychiatric: +sleep difficulty           PAST HISTORY:     Past Medical History:   Diagnosis Date    Closed fracture of left ankle 10/19/2023       Past Surgical History:   Procedure Laterality Date    NECK SURGERY         Family History   Problem Relation Name Age of Onset    Diabetes Mother      Hypertension Father      Parkinsonism Father         Social History     Socioeconomic History    Marital status: Single   Tobacco Use    Smoking status: Every Day     Current packs/day: 0.50     Types: Cigarettes    Smokeless tobacco: Never   Substance and Sexual Activity    Alcohol use: No    Drug use: No     Social Drivers of Health     Financial Resource Strain: Low Risk  (12/9/2024)    Overall Financial Resource Strain (CARDIA)     Difficulty of Paying Living Expenses: Not very hard   Food Insecurity: No Food Insecurity (12/9/2024)    Hunger Vital Sign     Worried About Running Out of Food in the Last Year: Never true     Ran Out of Food in the Last Year: Never true   Physical Activity: Sufficiently Active (12/9/2024)    Exercise Vital Sign     Days of Exercise per Week: 3 days     Minutes of Exercise per Session: 100 min   Stress: Stress Concern Present (12/9/2024)    [employer] - Occupational Stress Questionnaire     Feeling of Stress : To some extent   Housing Stability: Unknown (12/9/2024)    Housing Stability Vital Sign     Unable to Pay for Housing in the Last Year: No       MEDICATIONS & ALLERGIES:  "    Current Outpatient Medications on File Prior to Visit   Medication Sig    sildenafil citrate (SILDENAFIL ORAL) Take by mouth.    [DISCONTINUED] benzonatate (TESSALON) 100 MG capsule Take 100 mg by mouth 3 (three) times daily as needed for Cough.    [DISCONTINUED] clindamycin (CLEOCIN) 300 MG capsule Take 1 capsule (300 mg total) by mouth every 8 (eight) hours.    [DISCONTINUED] diazepam (VALIUM) 5 MG tablet Take 1 tablet (5 mg total) by mouth every 6 (six) hours as needed (muscle spasm).    [DISCONTINUED] ibuprofen (ADVIL,MOTRIN) 600 MG tablet Take 1 tablet (600 mg total) by mouth every 6 (six) hours as needed for Pain. (Patient not taking: Reported on 7/20/2020)    [DISCONTINUED] lidocaine HCl 2% (LIDOCAINE VISCOUS) 2 % Soln by Mucous Membrane route every 3 (three) hours. 5-10 ml gargle and spit    [DISCONTINUED] ondansetron (ZOFRAN-ODT) 4 MG TbDL Take 1 tablet (4 mg total) by mouth every 6 (six) hours as needed (nausea or vomiting). (Patient not taking: Reported on 12/21/2023)    [DISCONTINUED] traMADoL (ULTRAM) 50 mg tablet Take 1 tablet (50 mg total) by mouth every 8 (eight) hours as needed for Pain. (Patient not taking: Reported on 12/21/2023)     No current facility-administered medications on file prior to visit.       Review of patient's allergies indicates:  No Known Allergies    OBJECTIVE:     Vital Signs:  Vitals:    12/09/24 1454   BP: (!) 124/90   BP Location: Right arm   Patient Position: Sitting   Pulse: 87   Resp: 18   Temp: 98.3 °F (36.8 °C)   TempSrc: Oral   SpO2: 96%   Weight: 109.1 kg (240 lb 8.4 oz)   Height: 6' 3" (1.905 m)       Body mass index is 30.06 kg/m².     Physical Exam:  Physical Exam  Vitals and nursing note reviewed.   Constitutional:       General: He is not in acute distress.     Appearance: He is not ill-appearing.   HENT:      Head: Normocephalic and atraumatic.      Mouth/Throat:      Mouth: Mucous membranes are moist.      Pharynx: Oropharynx is clear. No oropharyngeal " "exudate or posterior oropharyngeal erythema.   Eyes:      Extraocular Movements: Extraocular movements intact.      Conjunctiva/sclera: Conjunctivae normal.   Cardiovascular:      Rate and Rhythm: Normal rate and regular rhythm.   Pulmonary:      Effort: Pulmonary effort is normal. No respiratory distress.      Breath sounds: Normal breath sounds. No wheezing or rales.   Chest:      Chest wall: No tenderness.   Abdominal:      Palpations: Abdomen is soft.      Tenderness: There is no abdominal tenderness. There is no guarding.   Musculoskeletal:         General: Normal range of motion.      Cervical back: Normal range of motion and neck supple. No tenderness.      Right lower leg: No edema.      Left lower leg: No edema.   Lymphadenopathy:      Cervical: No cervical adenopathy.   Skin:     General: Skin is warm and dry.   Neurological:      Mental Status: He is alert and oriented to person, place, and time.            Laboratory  Lab Results   Component Value Date    WBC 9.52 01/05/2022    HGB 16.5 01/05/2022    HCT 48.7 01/05/2022    MCV 91 01/05/2022     01/05/2022     Lab Results   Component Value Date     (H) 01/04/2024     01/04/2024    K 4.7 01/04/2024     01/05/2022    CO2 26 01/04/2024    BUN 14 01/04/2024    CREATININE 1.13 01/04/2024    CALCIUM 9.6 01/04/2024     No results found for: "INR", "PROTIME"  No results found for: "HGBA1C"  No results for input(s): "POCTGLUCOSE" in the last 72 hours.      Health Maintenance         Date Due Completion Date    Hepatitis C Screening Never done ---    Pneumococcal Vaccines (Age 0-64) (1 of 2 - PCV) Never done ---    HIV Screening Never done ---    TETANUS VACCINE Never done ---    Hemoglobin A1c (Diabetic Prevention Screening) Never done ---    Colorectal Cancer Screening Never done ---    Shingles Vaccine (1 of 2) Never done ---    Influenza Vaccine (1) 09/01/2024 11/23/2022    COVID-19 Vaccine (3 - 2024-25 season) 09/01/2024 2/11/2021    " Lipid Panel 01/04/2029 1/4/2024    RSV Vaccine (Age 60+ and Pregnant patients) (1 - 1-dose 75+ series) 02/14/2047 ---            ASSESSMENT & PLAN:   54 y.o. male who was seen today in clinic for establishing care and irritable bowel    Encounter to establish care  -     Hemoglobin A1C; Future; Expected date: 12/09/2024  -     CBC Without Differential; Future; Expected date: 12/09/2024  -     Comprehensive Metabolic Panel; Future; Expected date: 12/09/2024  -     TSH; Future; Expected date: 12/09/2024  -     Lipid Panel; Future; Expected date: 12/09/2024  -     Hepatitis C Antibody; Future; Expected date: 12/09/2024  -     HIV 1/2 Ag/Ab (4th Gen); Future; Expected date: 12/09/2024    Encounter for screening colonoscopy  -     Ambulatory referral/consult to Endo Procedure ; Future; Expected date: 12/10/2024    Tobacco user    Solitary pulmonary nodule  -     CT Chest Lung Screening Low Dose; Future; Expected date: 12/09/2024    Tenesmus  -     Ambulatory referral/consult to Gastroenterology; Future; Expected date: 12/16/2024    Irritable bowel syndrome, unspecified type  -     Ambulatory referral/consult to Gastroenterology; Future; Expected date: 12/16/2024    Other orders  -     varenicline (CHANTIX STARTING MONTH BOX) 0.5 mg (11)- 1 mg (42) tablet; Take one 0.5mg tab by mouth once daily X3 days,then increase to one 0.5mg tab twice daily X4 days,then increase to one 1mg tab twice daily  Dispense: 1 each; Refill: 0         1. Encounter to establish care    2. Encounter for screening colonoscopy    3. Tobacco user    4. Solitary pulmonary nodule    5. Tenesmus    6. Irritable bowel syndrome, unspecified type      1/2.  Fasting labs ordered.  Colonoscopy ordered.  Vaccines deferred  3/4.  Discussed importance fo cessation.  Offered tobacco cessation program, declined at this time.  Chantix prescribed.  LD CT scan ordered to monitor known nodules in active tobacco user.  4/5.  Patient with constant urge and  sensation for BM with abdominal discomfort.  Recommend food diary and healthy diet with concern for IBS.  Colonoscopy ordered.  No B symptoms reported.  GI referral.      Assessment & Plan    Suspect patient may have irritable bowel syndrome (IBS) causing tenesmus and frequent urges for bowel movements  Consider food diary to identify potential dietary triggers for IBS symptoms  Recommend colonoscopy for colon cancer screening due to patient's age (54) and to further evaluate GI symptoms  Continue annual CT to monitor previously identified lung nodules due to patient's smoking status    IRRITABLE BOWEL SYNDROME:  - Educated on possible causes of frequent urges for bowel movements, including IBS.  - Discussed potential dietary triggers for IBS symptoms, including fatty and fried foods.  - Keep food diary to identify potential dietary triggers for GI symptoms.  - Consider reducing intake of fatty and fried foods to improve GI symptoms.    NICOTINE DEPENDENCE AND TOBACCO USE:  - Discussed smoking cessation program offered by [facility], including virtual support and medication discounts.  - Mr. Wilkerson to quit smoking.  - Started Chantix (varenicline) starter pack for smoking cessation.    COLON CANCER SCREENING:  - Explained colonoscopy procedure, including preparation process and sedation.  - Colonoscopy ordered for colon cancer screening and evaluation of GI symptoms.  - Referred to gastroenterology for evaluation of GI symptoms and to perform colonoscopy.    CHRONIC OBSTRUCTIVE PULMONARY DISEASE:  - CT ordered for lung nodule follow-up, pending insurance approval.    FOLLOW-UP:  - Follow up in 3-4 months to reassess GI symptoms and discuss any additional imaging or interventions needed.  - Complete fasting labs 1 week prior to next appointment.  - Contact office if needed before next appointment.             RTC in 3 months for annual with labs 1 week prior or sooner if needed    Daniel Hernandez MD  [facility]    This note was generated with the assistance of ambient listening technology. Verbal consent was obtained by the patient and accompanying visitor(s) for the recording of patient appointment to facilitate this note. I attest to having reviewed and edited the generated note for accuracy, though some syntax or spelling errors may persist. Please contact the author of this note for any clarification.

## 2024-12-14 ENCOUNTER — CLINICAL SUPPORT (OUTPATIENT)
Dept: ENDOSCOPY | Facility: HOSPITAL | Age: 54
End: 2024-12-14
Payer: COMMERCIAL

## 2024-12-14 ENCOUNTER — TELEPHONE (OUTPATIENT)
Dept: ENDOSCOPY | Facility: HOSPITAL | Age: 54
End: 2024-12-14

## 2024-12-14 DIAGNOSIS — Z12.11 ENCOUNTER FOR SCREENING COLONOSCOPY: ICD-10-CM

## 2024-12-14 RX ORDER — SODIUM, POTASSIUM,MAG SULFATES 17.5-3.13G
1 SOLUTION, RECONSTITUTED, ORAL ORAL DAILY
Qty: 1 KIT | Refills: 0 | Status: SHIPPED | OUTPATIENT
Start: 2024-12-14 | End: 2024-12-16

## 2024-12-14 NOTE — TELEPHONE ENCOUNTER
Referral for procedure from PAT appointment      Spoke to patient to schedule procedure(s) Colonoscopy       Physician to perform procedure(s) Dr. DALE Ziegler  Date of Procedure (s) 02/17/25  Arrival Time 7:00 AM  Time of Procedure(s) 8:00 AM   Location of Procedure(s) [facility] [address] [facility]  Type of Rx Prep sent to patient: Suprep  Instructions provided to patient via [facility]    Patient was informed on the following information and verbalized understanding. Screening questionnaire reviewed with patient and complete. If procedure requires anesthesia, a responsible adult needs to be present to accompany the patient home, patient cannot drive after receiving anesthesia. Appointment details are tentative, especially check-in time. Patient will receive a prep-op call 7 days prior to confirm check-in time for procedure. If applicable the patient should contact their pharmacy to verify Rx for procedure prep is ready for pick-up. Patient was advised to call the scheduling department at [phone] if pharmacy states no Rx is available. Patient was advised to call the endoscopy scheduling department if any questions or concerns arise.      [facility] Scheduling Department

## 2025-01-29 ENCOUNTER — OFFICE VISIT (OUTPATIENT)
Dept: GASTROENTEROLOGY | Facility: CLINIC | Age: 55
End: 2025-01-29
Payer: COMMERCIAL

## 2025-01-29 VITALS
HEART RATE: 78 BPM | BODY MASS INDEX: 30.21 KG/M2 | DIASTOLIC BLOOD PRESSURE: 89 MMHG | WEIGHT: 242.94 LBS | SYSTOLIC BLOOD PRESSURE: 133 MMHG | HEIGHT: 75 IN

## 2025-01-29 DIAGNOSIS — R19.8 TENESMUS: ICD-10-CM

## 2025-01-29 DIAGNOSIS — K58.9 IRRITABLE BOWEL SYNDROME, UNSPECIFIED TYPE: ICD-10-CM

## 2025-01-29 PROCEDURE — 1159F MED LIST DOCD IN RCRD: CPT | Mod: CPTII,S$GLB,, | Performed by: NURSE PRACTITIONER

## 2025-01-29 PROCEDURE — 3075F SYST BP GE 130 - 139MM HG: CPT | Mod: CPTII,S$GLB,, | Performed by: NURSE PRACTITIONER

## 2025-01-29 PROCEDURE — 99999 PR PBB SHADOW E&M-EST. PATIENT-LVL IV: CPT | Mod: PBBFAC,,, | Performed by: NURSE PRACTITIONER

## 2025-01-29 PROCEDURE — 99204 OFFICE O/P NEW MOD 45 MIN: CPT | Mod: S$GLB,,, | Performed by: NURSE PRACTITIONER

## 2025-01-29 PROCEDURE — 1160F RVW MEDS BY RX/DR IN RCRD: CPT | Mod: CPTII,S$GLB,, | Performed by: NURSE PRACTITIONER

## 2025-01-29 PROCEDURE — 3008F BODY MASS INDEX DOCD: CPT | Mod: CPTII,S$GLB,, | Performed by: NURSE PRACTITIONER

## 2025-01-29 PROCEDURE — 3079F DIAST BP 80-89 MM HG: CPT | Mod: CPTII,S$GLB,, | Performed by: NURSE PRACTITIONER

## 2025-01-29 RX ORDER — DICYCLOMINE HYDROCHLORIDE 20 MG/1
20 TABLET ORAL 3 TIMES DAILY PRN
Qty: 60 TABLET | Refills: 2 | Status: SHIPPED | OUTPATIENT
Start: 2025-01-29

## 2025-01-31 ENCOUNTER — ANESTHESIA EVENT (OUTPATIENT)
Dept: ENDOSCOPY | Facility: HOSPITAL | Age: 55
End: 2025-01-31
Payer: COMMERCIAL

## 2025-01-31 NOTE — ANESTHESIA PREPROCEDURE EVALUATION
01/31/2025  Norris Wilkerson is a 54 y.o., male.  [facility]  Anesthesia Pre-Operative Evaluation       Patient Name: Norris Wilkerson  YOB: 1970  MRN: [id number]  CSN: [id number]      Code Status: Prior   Date of Procedure: 2/17/2025  Anesthesia: Choice Procedure: Procedure(s) (LRB):  COLONOSCOPY, SCREENING, LOW RISK PATIENT (N/A)  Pre-Operative Diagnosis: Encounter for screening colonoscopy [Z12.11]  Proceduralist: Surgeons and Role:     * Attila Ziegler MD - Primary        SUBJECTIVE:   Norris Wilkerson is a 54 y.o. male who  has a past medical history of Closed fracture of left ankle (10/19/2023)..     he is not on any long-term medications.     ALLERGIES:   Review of patient's allergies indicates:  No Known Allergies  LDA:          Lines/Drains/Airways       None                 Anesthesia Evaluation   MEDICATIONS:     Antibiotics (From admission, onward)      None          VTE Risk Mitigation (From admission, onward)      None              No current facility-administered medications for this encounter.     Current Outpatient Medications   Medication Sig Dispense Refill    dicyclomine (BENTYL) 20 mg tablet Take 1 tablet (20 mg total) by mouth 3 (three) times daily as needed (abdominal discomfort). 60 tablet 2    sildenafil citrate (SILDENAFIL ORAL) Take by mouth.      varenicline (CHANTIX STARTING MONTH BOX) 0.5 mg (11)- 1 mg (42) tablet Take one 0.5mg tab by mouth once daily X3 days,then increase to one 0.5mg tab twice daily X4 days,then increase to one 1mg tab twice daily 1 each 0          History:   There are no hospital problems to display for this patient.    Surgical History:    has a past surgical history that includes Neck surgery.   Social History:    has no history on file for sexual activity.  reports that he has been smoking cigarettes. He has never used smokeless  "tobacco. He reports that he does not drink alcohol and does not use drugs.     OBJECTIVE:     Vital Signs (Most Recent):    Vital Signs Range (Last 24H):          There is no height or weight on file to calculate BMI.   Wt Readings from Last 4 Encounters:   01/29/25 110.2 kg (242 lb 15.2 oz)   12/09/24 109.1 kg (240 lb 8.4 oz)   12/21/23 111.1 kg (245 lb)   10/20/23 111.1 kg (245 lb)       Significant Labs:  Lab Results   Component Value Date    WBC 9.52 01/05/2022    HGB 16.5 01/05/2022    HCT 48.7 01/05/2022     01/05/2022     01/04/2024    K 4.7 01/04/2024     01/05/2022    CREATININE 1.13 01/04/2024    BUN 14 01/04/2024    CO2 26 01/04/2024     (H) 01/04/2024    CALCIUM 9.6 01/04/2024    ALKPHOS 64 01/04/2024    ALT 23 01/04/2024    AST 17 01/04/2024    ALBUMIN 4.6 01/04/2024     No LMP for male patient.  No results found for this or any previous visit (from the past 72 hours).    EKG:   No results found for this or any previous visit.    TTE:  No results found for this or any previous visit.  No results found for: "EF"   No results found for this or any previous visit.  BARI:  No results found for this or any previous visit.  Stress Test:  No results found for this or any previous visit.     LHC:  No results found for this or any previous visit.     PFT:  No results found for: "FEV1", "FVC", "JGZ4YRC", "TLC", "DLCO"     ASSESSMENT/PLAN:        Pre-op Assessment    I have reviewed the Patient Summary Reports.     I have reviewed the Nursing Notes. I have reviewed the NPO Status.   I have reviewed the Medications.     Review of Systems  Anesthesia Hx:  No problems with previous Anesthesia             Denies Family Hx of Anesthesia complications.    Denies Personal Hx of Anesthesia complications.                    Social:  Smoker       Hematology/Oncology:  Hematology Normal   Oncology Normal                                   EENT/Dental:  EENT/Dental Normal         "   Cardiovascular:  Cardiovascular Normal                                              Pulmonary:  Pulmonary Normal        Multiple pulmonary nodules               Renal/:  Renal/ Normal                 Hepatic/GI:  Hepatic/GI Normal                    Musculoskeletal:  Musculoskeletal Normal                Neurological:  Neurology Normal                                      Endocrine:  Endocrine Normal          Obesity / BMI > 30  Dermatological:  Skin Normal    Psych:  Psychiatric Normal                       Anesthesia Plan  Type of Anesthesia, risks & benefits discussed:    Anesthesia Type: Gen Natural Airway  Intra-op Monitoring Plan: Standard ASA Monitors  Post Op Pain Control Plan: multimodal analgesia  Induction:  IV  Informed Consent: Informed consent signed with the Patient and all parties understand the risks and agree with anesthesia plan.  All questions answered.   ASA Score: 2  Day of Surgery Review of History & Physical: H&P Update referred to the surgeon/provider.    Ready For Surgery From Anesthesia Perspective.     .

## 2025-02-01 NOTE — PROGRESS NOTES
Subjective:       Patient ID: Norris Wilkerson is a 54 y.o. male.    Chief Complaint: Irritable Bowel Syndrome    55 y/o male with history of cholecystectomy referred by PCP for IBS. Patient reports normal BMs daily. Follows regular diet and has healthy appetite. Over the last two months he has been experiencing frequent fecal urges that do not result in bowel movement. Occurs in anticipation of long car rides. Symptoms started 2 months ago after having an episode of fecal incontinence. States he was not able to make it to the restroom one night after dining out. He dropped his significant other off at her home which is only a few miles from his home and had an accident mid-way to restroom. He now has lower abdominal cramping and the urge to have a BM when going on short and long car rides. Often urge does not lead to BM. Denies rectal pain, blood in stool, night sweats or unintentional weight loss. Initially screening colonoscopy scheduled 2/17.          Past Medical History:   Diagnosis Date    Closed fracture of left ankle 10/19/2023       Past Surgical History:   Procedure Laterality Date    CHOLECYSTECTOMY      NECK SURGERY         Family History   Problem Relation Name Age of Onset    Diabetes Mother      Hypertension Father      Parkinsonism Father         Social History     Socioeconomic History    Marital status: Single   Tobacco Use    Smoking status: Every Day     Current packs/day: 0.50     Types: Cigarettes    Smokeless tobacco: Never   Substance and Sexual Activity    Alcohol use: No    Drug use: No     Social Drivers of Health     Financial Resource Strain: Low Risk  (12/9/2024)    Overall Financial Resource Strain (CARDIA)     Difficulty of Paying Living Expenses: Not very hard   Food Insecurity: No Food Insecurity (12/9/2024)    Hunger Vital Sign     Worried About Running Out of Food in the Last Year: Never true     Ran Out of Food in the Last Year: Never true   Physical Activity: Sufficiently Active  "(12/9/2024)    Exercise Vital Sign     Days of Exercise per Week: 3 days     Minutes of Exercise per Session: 100 min   Stress: Stress Concern Present (12/9/2024)    [employer] - Occupational Stress Questionnaire     Feeling of Stress : To some extent   Housing Stability: Unknown (12/9/2024)    Housing Stability Vital Sign     Unable to Pay for Housing in the Last Year: No       Review of Systems   Constitutional:  Negative for appetite change.   HENT:  Negative for trouble swallowing.    Respiratory:  Negative for shortness of breath.    Cardiovascular:  Negative for chest pain.   Gastrointestinal:  Positive for abdominal pain.   Musculoskeletal:  Negative for back pain.   Psychiatric/Behavioral:  Negative for dysphoric mood.          Objective:     Vitals:    01/29/25 1030   BP: 133/89   BP Location: Left arm   Patient Position: Sitting   Pulse: 78   Weight: 110.2 kg (242 lb 15.2 oz)   Height: 6' 3" (1.905 m)          Physical Exam  Constitutional:       General: He is not in acute distress.     Appearance: Normal appearance.   HENT:      Head: Normocephalic.   Eyes:      Conjunctiva/sclera: Conjunctivae normal.   Pulmonary:      Effort: Pulmonary effort is normal. No respiratory distress.   Abdominal:      General: Bowel sounds are normal.      Palpations: Abdomen is soft.   Musculoskeletal:         General: Normal range of motion.      Cervical back: Normal range of motion.   Skin:     General: Skin is warm and dry.   Neurological:      Mental Status: He is alert and oriented to person, place, and time.   Psychiatric:         Mood and Affect: Mood normal.         Behavior: Behavior normal.               Assessment:         ICD-10-CM ICD-9-CM   1. Tenesmus  R19.8 787.99   2. Irritable bowel syndrome, unspecified type  K58.9 564.1       Plan:       Tenesmus  -     Ambulatory referral/consult to Gastroenterology    Irritable bowel syndrome, unspecified type  -     Ambulatory " referral/consult to Gastroenterology  -     dicyclomine (BENTYL) 20 mg tablet; Take 1 tablet (20 mg total) by mouth 3 (three) times daily as needed (abdominal discomfort).  Dispense: 60 tablet; Refill: 2    - Symptoms likely functional in nature  - May try dicyclomine prior to car rides  - Keep appointment as schedule for colonoscopy to r/o organic cause of symptoms    Follow up if symptoms worsen or fail to improve.     Patient's Medications   New Prescriptions    DICYCLOMINE (BENTYL) 20 MG TABLET    Take 1 tablet (20 mg total) by mouth 3 (three) times daily as needed (abdominal discomfort).   Previous Medications    SILDENAFIL CITRATE (SILDENAFIL ORAL)    Take by mouth.    VARENICLINE (CHANTIX STARTING MONTH BOX) 0.5 MG (11)- 1 MG (42) TABLET    Take one 0.5mg tab by mouth once daily X3 days,then increase to one 0.5mg tab twice daily X4 days,then increase to one 1mg tab twice daily   Modified Medications    No medications on file   Discontinued Medications    No medications on file

## 2025-02-12 ENCOUNTER — TELEPHONE (OUTPATIENT)
Dept: ENDOSCOPY | Facility: HOSPITAL | Age: 55
End: 2025-02-12

## 2025-02-17 ENCOUNTER — HOSPITAL ENCOUNTER (OUTPATIENT)
Facility: HOSPITAL | Age: 55
Discharge: HOME | End: 2025-02-17
Attending: INTERNAL MEDICINE | Admitting: INTERNAL MEDICINE
Payer: COMMERCIAL

## 2025-02-17 ENCOUNTER — RESULTS FOLLOW-UP (OUTPATIENT)
Dept: INTERNAL MEDICINE | Facility: CLINIC | Age: 55
End: 2025-02-17
Payer: COMMERCIAL

## 2025-02-17 ENCOUNTER — ANESTHESIA (OUTPATIENT)
Dept: ENDOSCOPY | Facility: HOSPITAL | Age: 55
End: 2025-02-17
Payer: COMMERCIAL

## 2025-02-17 VITALS
WEIGHT: 240 LBS | HEIGHT: 75 IN | SYSTOLIC BLOOD PRESSURE: 119 MMHG | HEART RATE: 67 BPM | DIASTOLIC BLOOD PRESSURE: 84 MMHG | TEMPERATURE: 98 F | RESPIRATION RATE: 18 BRPM | OXYGEN SATURATION: 97 % | BODY MASS INDEX: 29.84 KG/M2

## 2025-02-17 DIAGNOSIS — Z12.11 SCREEN FOR COLON CANCER: ICD-10-CM

## 2025-02-17 DIAGNOSIS — Z12.11 COLON CANCER SCREENING: Primary | ICD-10-CM

## 2025-02-17 PROCEDURE — 88305 TISSUE EXAM BY PATHOLOGIST: CPT | Performed by: PATHOLOGY

## 2025-02-17 PROCEDURE — 63600175 PHARM REV CODE 636 W HCPCS: Performed by: NURSE ANESTHETIST, CERTIFIED REGISTERED

## 2025-02-17 PROCEDURE — 94761 N-INVAS EAR/PLS OXIMETRY MLT: CPT

## 2025-02-17 PROCEDURE — 45385 COLONOSCOPY W/LESION REMOVAL: CPT | Mod: 33 | Performed by: INTERNAL MEDICINE

## 2025-02-17 PROCEDURE — 25000003 PHARM REV CODE 250: Performed by: NURSE ANESTHETIST, CERTIFIED REGISTERED

## 2025-02-17 PROCEDURE — 99900035 HC TECH TIME PER 15 MIN (STAT)

## 2025-02-17 RX ORDER — PROPOFOL 10 MG/ML
VIAL (ML) INTRAVENOUS
Status: DISCONTINUED | OUTPATIENT
Start: 2025-02-17 | End: 2025-02-17

## 2025-02-17 RX ORDER — SODIUM CHLORIDE 9 MG/ML
INJECTION, SOLUTION INTRAVENOUS CONTINUOUS
Status: DISCONTINUED | OUTPATIENT
Start: 2025-02-17 | End: 2025-02-17 | Stop reason: HOSPADM

## 2025-02-17 RX ORDER — LIDOCAINE HYDROCHLORIDE 20 MG/ML
INJECTION INTRAVENOUS
Status: DISCONTINUED | OUTPATIENT
Start: 2025-02-17 | End: 2025-02-17

## 2025-02-17 RX ORDER — PROPOFOL 10 MG/ML
VIAL (ML) INTRAVENOUS CONTINUOUS PRN
Status: DISCONTINUED | OUTPATIENT
Start: 2025-02-17 | End: 2025-02-17

## 2025-02-17 RX ADMIN — PROPOFOL 175 MCG/KG/MIN: 10 INJECTION, EMULSION INTRAVENOUS at 08:02

## 2025-02-17 RX ADMIN — SODIUM CHLORIDE: 0.9 INJECTION, SOLUTION INTRAVENOUS at 07:02

## 2025-02-17 RX ADMIN — PROPOFOL 100 MG: 10 INJECTION, EMULSION INTRAVENOUS at 08:02

## 2025-02-17 RX ADMIN — LIDOCAINE HYDROCHLORIDE 100 MG: 20 INJECTION INTRAVENOUS at 08:02

## 2025-02-17 RX ADMIN — GLYCOPYRROLATE 0.1 MG: 0.2 INJECTION, SOLUTION INTRAMUSCULAR; INTRAVENOUS at 08:02

## 2025-02-17 NOTE — H&P
Short Stay Endoscopy History and Physical    PCP - Daniel Hernandez MD    Procedure - Colonoscopy  Sedation: GA  ASA - per anesthesia  Mallampati - per anesthesia  History of Anesthesia problems - no  Family history Anesthesia problems -  no     HPI:  This is a 55 y.o. male here for evaluation of : Screening for CRC    Reflux - no  Dysphagia - no  Abdominal pain - no  Diarrhea - no    ROS:  Constitutional: No fevers, chills, No weight loss  ENT: No allergies  CV: No chest pain  Pulm: No cough, No shortness of breath  Ophtho: No vision changes  GI: see HPI  Medical History:  has a past medical history of Closed fracture of left ankle (10/19/2023).    Surgical History:  has a past surgical history that includes Neck surgery and Cholecystectomy.    Family History: family history includes Diabetes in his mother; Hypertension in his father; Parkinsonism in his father.. Otherwise no colon cancer, inflammatory bowel disease, or GI malignancies.    Social History:  reports that he has been smoking cigarettes. He has never used smokeless tobacco. He reports that he does not drink alcohol and does not use drugs.    Review of patient's allergies indicates:  No Known Allergies    Medications:   Prescriptions Prior to Admission[1]    Objective Findings:    Vital Signs: Per nursing notes.    Physical Exam:  General Appearance: Well appearing in no acute distress  Head:   Normocephalic, without obvious abnormality  Eyes:    No scleral icterus  Airway: Open  Neck: No restriction in mobility  Lungs: CTA bilaterally in anterior and posterior fields, no wheezes, no crackles.  Heart:  Regular rate and rhythm, S1, S2 normal, no murmurs heard  Abdomen: Soft, non tender, non distended      Labs:  Lab Results   Component Value Date    WBC 9.52 01/05/2022    HGB 16.5 01/05/2022    HCT 48.7 01/05/2022     01/05/2022    ALT 23 01/04/2024    AST 17 01/04/2024     01/04/2024    K 4.7 01/04/2024     01/05/2022    CREATININE  1.13 01/04/2024    BUN 14 01/04/2024    CO2 26 01/04/2024         I have explained the risks and benefits of endoscopy procedures to the patient including but not limited to bleeding, perforation, infection, and death.    Thank you so much for allowing me to participate in the care of Norris Rock Ziegler MD         [1]   Medications Prior to Admission   Medication Sig Dispense Refill Last Dose/Taking    dicyclomine (BENTYL) 20 mg tablet Take 1 tablet (20 mg total) by mouth 3 (three) times daily as needed (abdominal discomfort). 60 tablet 2     sildenafil citrate (SILDENAFIL ORAL) Take by mouth.       varenicline (CHANTIX STARTING MONTH BOX) 0.5 mg (11)- 1 mg (42) tablet Take one 0.5mg tab by mouth once daily X3 days,then increase to one 0.5mg tab twice daily X4 days,then increase to one 1mg tab twice daily 1 each 0

## 2025-02-17 NOTE — TRANSFER OF CARE
"Anesthesia Transfer of Care Note    Patient: Norris Wilkerson    Procedure(s) Performed: Procedure(s) (LRB):  COLONOSCOPY, SCREENING, LOW RISK PATIENT (N/A)    Patient location: PACU    Anesthesia Type: general    Transport from OR: Transported from OR on room air with adequate spontaneous ventilation    Post pain: adequate analgesia    Post assessment: no apparent anesthetic complications and tolerated procedure well    Post vital signs: stable    Level of consciousness: responds to stimulation and sedated    Nausea/Vomiting: no nausea/vomiting    Complications: none    Transfer of care protocol was followed      Last vitals: Visit Vitals  BP (!) 140/63 (BP Location: Left arm, Patient Position: Lying)   Pulse 79   Temp 36.2 °C (97.1 °F) (Temporal)   Resp 16   Ht 6' 3" (1.905 m)   Wt 108.9 kg (240 lb)   SpO2 97%   BMI 30.00 kg/m²     "

## 2025-02-17 NOTE — [employer] PATIENT INSTRUCTIONS
Discharge Summary/Instructions after an Endoscopic Procedure  Patient Name: Norris Wilkerson  Patient MRN: [id number]  Patient YOB: 1970 Monday, February 17, 2025  Attila Ziegler MD  Dear patient,  As a result of recent federal legislation (The Federal Cures Act), you may   receive lab or pathology results from your procedure in your [facility]   account before your physician is able to contact you. Your physician or   their representative will relay the results to you with their   recommendations at their soonest availability.  Thank you,  RESTRICTIONS:  During your procedure today, you received medications for sedation.  These   medications may affect your judgment, balance and coordination.  Therefore,   for 24 hours, you have the following restrictions:   - DO NOT drive a car, operate machinery, make legal/financial decisions,   sign important papers or drink alcohol.    ACTIVITY:  Today: no heavy lifting, straining or running due to procedural   sedation/anesthesia.  The following day: return to full activity including work.  DIET:  Eat and drink normally unless instructed otherwise.     TREATMENT FOR COMMON SIDE EFFECTS:  - Mild abdominal pain, nausea, belching, bloating or excessive gas:  rest,   eat lightly and use a heating pad.  - Sore Throat: treat with throat lozenges and/or gargle with warm salt   water.  - Because air was used during the procedure, expelling large amounts of air   from your rectum or belching is normal.  - If a bowel prep was taken, you may not have a bowel movement for 1-3 days.    This is normal.  SYMPTOMS TO WATCH FOR AND REPORT TO YOUR PHYSICIAN:  1. Abdominal pain or bloating, other than gas cramps.  2. Chest pain.  3. Back pain.  4. Signs of infection such as: chills or fever occurring within 24 hours   after the procedure.  5. Rectal bleeding, which would show as bright red, maroon, or black stools.   (A tablespoon of blood from the rectum is not serious, especially if    hemorrhoids are present.)  6. Vomiting.  7. Weakness or dizziness.  GO DIRECTLY TO THE NEAREST EMERGENCY ROOM IF YOU HAVE ANY OF THE FOLLOWING:      Difficulty breathing              Chills and/or fever over 101 F   Persistent vomiting and/or vomiting blood   Severe abdominal pain   Severe chest pain   Black, tarry stools   Bleeding- more than one tablespoon   Any other symptom or condition that you feel may need urgent attention  Your doctor recommends these additional instructions:  If any biopsies were taken, your doctors clinic will contact you in 1 to 2   weeks with any results.  - Patient has a contact number available for emergencies.  The signs and   symptoms of potential delayed complications were discussed with the   patient.  Return to normal activities tomorrow.  Written discharge   instructions were provided to the patient.   - Discharge patient to home.   - Resume previous diet.   - Continue present medications.   - Await pathology results.   - Repeat colonoscopy in 5 years for surveillance.   For questions, problems or results please call your physician - Attila Ziegler MD at Work:  [phone].  [facility], EMERGENCY ROOM PHONE NUMBER: [phone]  IF A COMPLICATION OR EMERGENCY SITUATION ARISES AND YOU ARE UNABLE TO REACH   YOUR PHYSICIAN - GO DIRECTLY TO THE EMERGENCY ROOM.  Attila Ziegler MD  2/17/2025 8:28:08 AM  This report has been verified and signed electronically.  Dear patient,  As a result of recent federal legislation (The Federal Cures Act), you may   receive lab or pathology results from your procedure in your [facility]   account before your physician is able to contact you. Your physician or   their representative will relay the results to you with their   recommendations at their soonest availability.  Thank you,  PROVATION

## 2025-02-17 NOTE — PLAN OF CARE
Pt arrived to recovery dosc via stretcher per GI team. Bedside report received. Pt attached to bedside monitor. VSS. Pt _sedated__ post procedure. Pt on room air oxygen; sats _97___%. Pt IV access  infusing NS. Will continue to monitor.

## 2025-02-17 NOTE — PLAN OF CARE
Pt in preop bay 5, VSS, meds given and IV inserted. Pt denies any open wounds on body or the use of any weight loss injections. Pt needs Procedural and anesthesia consent, otherwise ready to roll.

## 2025-02-19 LAB
FINAL PATHOLOGIC DIAGNOSIS: NORMAL
GROSS: NORMAL
Lab: NORMAL

## 2025-02-19 NOTE — ANESTHESIA POSTPROCEDURE EVALUATION
Anesthesia Post Evaluation    Patient: Norris Wilkerson    Procedure(s) Performed: Procedure(s) (LRB):  COLONOSCOPY, SCREENING, LOW RISK PATIENT (N/A)    Final Anesthesia Type: general      Patient location during evaluation: GI PACU  Patient participation: Yes- Able to Participate  Level of consciousness: awake and alert, oriented and awake  Post-procedure vital signs: reviewed and stable  Pain management: adequate  Airway patency: patent    PONV status at discharge: No PONV  Anesthetic complications: no      Cardiovascular status: stable  Respiratory status: unassisted and room air  Hydration status: euvolemic  Follow-up not needed.              Vitals Value Taken Time   /84 02/17/25 08:54   Temp 36.5 °C (97.7 °F) 02/17/25 08:27   Pulse 68 02/17/25 08:56   Resp 21 02/17/25 08:56   SpO2 96 % 02/17/25 08:56   Vitals shown include unfiled device data.      Event Time   Out of Recovery 08:54:00         Pain/Maggie Score: No data recorded

## 2025-02-20 ENCOUNTER — PATIENT MESSAGE (OUTPATIENT)
Dept: GASTROENTEROLOGY | Facility: CLINIC | Age: 55
End: 2025-02-20
Payer: COMMERCIAL

## 2025-02-20 NOTE — TELEPHONE ENCOUNTER
----- Message from Attila Ziegler MD sent at 2/20/2025  8:29 AM CST -----  Please call and notify patient, the colon polyp was benign.    ----- Message -----  From: [facility] Interface  Sent: 2/19/2025   7:28 PM CST  To: Attila Ziegler MD

## 2025-03-05 ENCOUNTER — LAB VISIT (OUTPATIENT)
Dept: LAB | Facility: HOSPITAL | Age: 55
End: 2025-03-05
Payer: COMMERCIAL

## 2025-03-05 DIAGNOSIS — Z76.89 ENCOUNTER TO ESTABLISH CARE: ICD-10-CM

## 2025-03-05 LAB
ALBUMIN SERPL BCP-MCNC: 4.3 G/DL (ref 3.5–5.2)
ALP SERPL-CCNC: 74 U/L (ref 40–150)
ALT SERPL W/O P-5'-P-CCNC: 16 U/L (ref 10–44)
ANION GAP SERPL CALC-SCNC: 10 MMOL/L (ref 8–16)
AST SERPL-CCNC: 18 U/L (ref 10–40)
BILIRUB SERPL-MCNC: 0.5 MG/DL (ref 0.1–1)
BUN SERPL-MCNC: 14 MG/DL (ref 6–20)
CALCIUM SERPL-MCNC: 9.8 MG/DL (ref 8.7–10.5)
CHLORIDE SERPL-SCNC: 104 MMOL/L (ref 95–110)
CHOLEST SERPL-MCNC: 165 MG/DL (ref 120–199)
CHOLEST/HDLC SERPL: 3.7 {RATIO} (ref 2–5)
CO2 SERPL-SCNC: 27 MMOL/L (ref 23–29)
CREAT SERPL-MCNC: 1.1 MG/DL (ref 0.5–1.4)
ERYTHROCYTE [DISTWIDTH] IN BLOOD BY AUTOMATED COUNT: 12 % (ref 11.5–14.5)
EST. GFR  (NO RACE VARIABLE): >60 ML/MIN/1.73 M^2
ESTIMATED AVG GLUCOSE: 126 MG/DL (ref 68–131)
GLUCOSE SERPL-MCNC: 129 MG/DL (ref 70–110)
HBA1C MFR BLD: 6 % (ref 4–5.6)
HCT VFR BLD AUTO: 50.5 % (ref 40–54)
HCV AB SERPL QL IA: NORMAL
HDLC SERPL-MCNC: 45 MG/DL (ref 40–75)
HDLC SERPL: 27.3 % (ref 20–50)
HGB BLD-MCNC: 16.9 G/DL (ref 14–18)
HIV 1+2 AB+HIV1 P24 AG SERPL QL IA: NORMAL
LDLC SERPL CALC-MCNC: 94.2 MG/DL (ref 63–159)
MCH RBC QN AUTO: 30 PG (ref 27–31)
MCHC RBC AUTO-ENTMCNC: 33.5 G/DL (ref 32–36)
MCV RBC AUTO: 90 FL (ref 82–98)
NONHDLC SERPL-MCNC: 120 MG/DL
PLATELET # BLD AUTO: 232 K/UL (ref 150–450)
PMV BLD AUTO: 9.5 FL (ref 9.2–12.9)
POTASSIUM SERPL-SCNC: 4.8 MMOL/L (ref 3.5–5.1)
PROT SERPL-MCNC: 7.8 G/DL (ref 6–8.4)
RBC # BLD AUTO: 5.64 M/UL (ref 4.6–6.2)
SODIUM SERPL-SCNC: 141 MMOL/L (ref 136–145)
TRIGL SERPL-MCNC: 129 MG/DL (ref 30–150)
TSH SERPL DL<=0.005 MIU/L-ACNC: 1.85 UIU/ML (ref 0.4–4)
WBC # BLD AUTO: 7.23 K/UL (ref 3.9–12.7)

## 2025-03-05 PROCEDURE — 83036 HEMOGLOBIN GLYCOSYLATED A1C: CPT

## 2025-03-05 PROCEDURE — 36415 COLL VENOUS BLD VENIPUNCTURE: CPT

## 2025-03-05 PROCEDURE — 80053 COMPREHEN METABOLIC PANEL: CPT

## 2025-03-05 PROCEDURE — 85027 COMPLETE CBC AUTOMATED: CPT

## 2025-03-05 PROCEDURE — 87389 HIV-1 AG W/HIV-1&-2 AB AG IA: CPT

## 2025-03-05 PROCEDURE — 86803 HEPATITIS C AB TEST: CPT

## 2025-03-05 PROCEDURE — 84443 ASSAY THYROID STIM HORMONE: CPT

## 2025-03-05 PROCEDURE — 80061 LIPID PANEL: CPT

## 2025-03-06 ENCOUNTER — RESULTS FOLLOW-UP (OUTPATIENT)
Dept: INTERNAL MEDICINE | Facility: CLINIC | Age: 55
End: 2025-03-06

## 2025-03-06 DIAGNOSIS — R73.03 PREDIABETES: Primary | ICD-10-CM

## 2025-03-06 DIAGNOSIS — R91.8 MULTIPLE PULMONARY NODULES: ICD-10-CM

## 2025-03-07 PROBLEM — R73.03 PREDIABETES: Status: ACTIVE | Noted: 2025-03-07

## 2025-03-07 NOTE — PROGRESS NOTES
Norris Wilkerson  1970        Subjective     Chief Complaint: Follow-up      History of Present Illness:  Mr. Norris Wilkerson is a 55 y.o. male who presents to clinic for annual.      C-scope: utd, next due 2/2030      History of Present Illness    CHIEF COMPLAINT:  Mr. Wilkerson presents today for follow up of lab results.    PRE-DIABETES:  His A1C is 6.0, unchanged since 2020. He has a family history of diabetes (mother). He is making dietary changes including switching to artificial sweeteners in coffee and maintaining a dietary diary. He acknowledges the need to reduce consumption of sweets, bread, potatoes, and pasta.    POST CHOLECYSTECTOMY SYNDROME:  He has persistent loose stools following gallbladder removal. A gastroenterologist suggested possible mild Irritable Bowel Syndrome (IBS). He was prescribed medication for urgency but has not started treatment. He also reports having a polyp.    PULMONARY:  He undergoes annual monitoring of a pulmonary nodule, which was reduced from 2-3 times yearly after demonstrating stability. Continued monitoring is primarily due to active smoking status. He currently smokes 5 cigarettes daily and previously attempted cessation with Chantix but discontinued due to significant sleep disturbance.    CURRENT SYMPTOMS:  He reports recovering from a mild cold with nasal congestion.    IMMUNIZATIONS:  He typically receives annual influenza vaccination but missed this year. Shingles vaccine is due.          Review of Systems   Constitutional:  Negative for chills and fever.   Respiratory:  Negative for shortness of breath.    Cardiovascular:  Negative for chest pain.   Genitourinary:  Negative for dysuria and hematuria.        PAST HISTORY:     Past Medical History:   Diagnosis Date    Closed fracture of left ankle 10/19/2023       Past Surgical History:   Procedure Laterality Date    CHOLECYSTECTOMY      COLONOSCOPY, SCREENING, LOW RISK PATIENT N/A 2/17/2025    Procedure: COLONOSCOPY,  SCREENING, LOW RISK PATIENT;  Surgeon: Attila Ziegler MD;  Location: [facility] ENDOSCOPY;  Service: Endoscopy;  Laterality: N/A;  Ref by Dr DALE Hernandez [facility], portal - PC  2/7-pre call complete-tb  2/12- SB    NECK SURGERY         Family History   Problem Relation Name Age of Onset    Diabetes Mother      Hypertension Father      Parkinsonism Father         Social History     Socioeconomic History    Marital status: Single   Tobacco Use    Smoking status: Every Day     Current packs/day: 0.50     Types: Cigarettes    Smokeless tobacco: Never   Substance and Sexual Activity    Alcohol use: No    Drug use: No     Social Drivers of Health     Financial Resource Strain: Low Risk  (3/6/2025)    Overall Financial Resource Strain (CARDIA)     Difficulty of Paying Living Expenses: Not hard at all   Food Insecurity: No Food Insecurity (3/6/2025)    Hunger Vital Sign     Worried About Running Out of Food in the Last Year: Never true     Ran Out of Food in the Last Year: Never true   Transportation Needs: No Transportation Needs (3/6/2025)    PRAPARE - Transportation     Lack of Transportation (Medical): No     Lack of Transportation (Non-Medical): No   Physical Activity: Sufficiently Active (3/6/2025)    Exercise Vital Sign     Days of Exercise per Week: 3 days     Minutes of Exercise per Session: 60 min   Stress: No Stress Concern Present (3/6/2025)    [employer] - Occupational Stress Questionnaire     Feeling of Stress : Only a little   Recent Concern: Stress - Stress Concern Present (12/9/2024)    [employer] - Occupational Stress Questionnaire     Feeling of Stress : To some extent   Housing Stability: Low Risk  (3/6/2025)    Housing Stability Vital Sign     Unable to Pay for Housing in the Last Year: No     Number of Times Moved in the Last Year: 0     Homeless in the Last Year: No       MEDICATIONS & ALLERGIES:     Current Outpatient Medications on File Prior to  "Visit   Medication Sig    [DISCONTINUED] varenicline (CHANTIX STARTING MONTH BOX) 0.5 mg (11)- 1 mg (42) tablet Take one 0.5mg tab by mouth once daily X3 days,then increase to one 0.5mg tab twice daily X4 days,then increase to one 1mg tab twice daily    dicyclomine (BENTYL) 20 mg tablet Take 1 tablet (20 mg total) by mouth 3 (three) times daily as needed (abdominal discomfort).    sildenafil citrate (SILDENAFIL ORAL) Take by mouth.     No current facility-administered medications on file prior to visit.       Review of patient's allergies indicates:  No Known Allergies    OBJECTIVE:     Vital Signs:  Vitals:    03/10/25 0817   BP: 118/84   BP Location: Right arm   Patient Position: Sitting   Pulse: 73   Resp: 18   Temp: 97.8 °F (36.6 °C)   TempSrc: Oral   SpO2: 97%   Weight: 108.4 kg (238 lb 15.7 oz)   Height: 6' 3" (1.905 m)       Body mass index is 29.87 kg/m².     Physical Exam:  Physical Exam  Vitals and nursing note reviewed.   Constitutional:       General: He is not in acute distress.     Appearance: He is not ill-appearing.   HENT:      Head: Normocephalic and atraumatic.      Mouth/Throat:      Mouth: Mucous membranes are moist.      Pharynx: Oropharynx is clear. No oropharyngeal exudate or posterior oropharyngeal erythema.   Eyes:      Extraocular Movements: Extraocular movements intact.      Conjunctiva/sclera: Conjunctivae normal.   Cardiovascular:      Rate and Rhythm: Normal rate and regular rhythm.   Pulmonary:      Effort: Pulmonary effort is normal. No respiratory distress.      Breath sounds: Normal breath sounds. No wheezing or rales.   Chest:      Chest wall: No tenderness.   Abdominal:      Palpations: Abdomen is soft.      Tenderness: There is no abdominal tenderness. There is no guarding.   Musculoskeletal:         General: Normal range of motion.      Cervical back: Normal range of motion and neck supple. No tenderness.      Right lower leg: No edema.      Left lower leg: No edema. " "  Lymphadenopathy:      Cervical: No cervical adenopathy.   Skin:     General: Skin is warm and dry.   Neurological:      Mental Status: He is alert and oriented to person, place, and time.            Laboratory  Lab Results   Component Value Date    WBC 7.23 03/05/2025    HGB 16.9 03/05/2025    HCT 50.5 03/05/2025    MCV 90 03/05/2025     03/05/2025     Lab Results   Component Value Date     (H) 03/05/2025     03/05/2025    K 4.8 03/05/2025     03/05/2025    CO2 27 03/05/2025    BUN 14 03/05/2025    CREATININE 1.1 03/05/2025    CALCIUM 9.8 03/05/2025     No results found for: "INR", "PROTIME"  Lab Results   Component Value Date    HGBA1C 6.0 (H) 03/05/2025     No results for input(s): "POCTGLUCOSE" in the last 72 hours.      Health Maintenance         Date Due Completion Date    TETANUS VACCINE Never done ---    Pneumococcal Vaccines (Age 50+) (1 of 2 - PCV) Never done ---    Shingles Vaccine (1 of 2) Never done ---    Influenza Vaccine (1) 09/01/2024 11/23/2022    COVID-19 Vaccine (3 - 2024-25 season) 09/01/2024 2/11/2021    Hemoglobin A1c (Prediabetes) 03/05/2026 3/5/2025    Colorectal Cancer Screening 02/17/2030 2/17/2025    Lipid Panel 03/05/2030 3/5/2025    RSV Vaccine (Age 60+ and Pregnant patients) (1 - 1-dose 75+ series) 02/14/2047 ---            ASSESSMENT & PLAN:   55 y.o. male who was seen today in clinic for annual.    Annual physical exam    Prediabetes  -     metFORMIN (GLUCOPHAGE) 500 MG tablet; Take 1 tablet (500 mg total) by mouth daily with breakfast.  Dispense: 90 tablet; Refill: 0    Class 1 obesity with body mass index (BMI) of 30.0 to 30.9 in adult, unspecified obesity type, unspecified whether serious comorbidity present    Tobacco user    Solitary pulmonary nodule         1. Annual physical exam    2. Prediabetes    3. Class 1 obesity with body mass index (BMI) of 30.0 to 30.9 in adult, unspecified obesity type, unspecified whether serious comorbidity present    4. " Tobacco user    5. Solitary pulmonary nodule        Recent fasting labs reviewed.  Vaccines deferred today.  Patient to get at outside pharmacy with SO.  2/3.  Discussed importance of diet and exercise.  Starting low dose metformin.  Plan to recheck A1c after lifestyle modifications and recently started med.  4/5.  Discussed importance of cessation.  LD CT scan ordered from prior visit.  Will assist with scheduling.      RTC in 6 months with labs 1 week prior    Daniel Hernandez MD  [facility]    This note was generated with the assistance of ambient listening technology. Verbal consent was obtained by the patient and accompanying visitor(s) for the recording of patient appointment to facilitate this note. I attest to having reviewed and edited the generated note for accuracy, though some syntax or spelling errors may persist. Please contact the author of this note for any clarification.

## 2025-03-10 ENCOUNTER — OFFICE VISIT (OUTPATIENT)
Dept: INTERNAL MEDICINE | Facility: CLINIC | Age: 55
End: 2025-03-10
Payer: COMMERCIAL

## 2025-03-10 VITALS
BODY MASS INDEX: 29.72 KG/M2 | WEIGHT: 239 LBS | DIASTOLIC BLOOD PRESSURE: 84 MMHG | RESPIRATION RATE: 18 BRPM | OXYGEN SATURATION: 97 % | SYSTOLIC BLOOD PRESSURE: 118 MMHG | HEIGHT: 75 IN | TEMPERATURE: 98 F | HEART RATE: 73 BPM

## 2025-03-10 DIAGNOSIS — Z00.00 ANNUAL PHYSICAL EXAM: Primary | ICD-10-CM

## 2025-03-10 DIAGNOSIS — Z72.0 TOBACCO USER: ICD-10-CM

## 2025-03-10 DIAGNOSIS — E66.811 CLASS 1 OBESITY WITH BODY MASS INDEX (BMI) OF 30.0 TO 30.9 IN ADULT, UNSPECIFIED OBESITY TYPE, UNSPECIFIED WHETHER SERIOUS COMORBIDITY PRESENT: ICD-10-CM

## 2025-03-10 DIAGNOSIS — R73.03 PREDIABETES: ICD-10-CM

## 2025-03-10 DIAGNOSIS — R91.1 SOLITARY PULMONARY NODULE: ICD-10-CM

## 2025-03-10 PROCEDURE — 99396 PREV VISIT EST AGE 40-64: CPT | Mod: S$GLB,,,

## 2025-03-10 PROCEDURE — 1160F RVW MEDS BY RX/DR IN RCRD: CPT | Mod: CPTII,S$GLB,,

## 2025-03-10 PROCEDURE — 99999 PR PBB SHADOW E&M-EST. PATIENT-LVL III: CPT | Mod: PBBFAC,,,

## 2025-03-10 PROCEDURE — 1159F MED LIST DOCD IN RCRD: CPT | Mod: CPTII,S$GLB,,

## 2025-03-10 PROCEDURE — 3074F SYST BP LT 130 MM HG: CPT | Mod: CPTII,S$GLB,,

## 2025-03-10 PROCEDURE — 3008F BODY MASS INDEX DOCD: CPT | Mod: CPTII,S$GLB,,

## 2025-03-10 PROCEDURE — 3079F DIAST BP 80-89 MM HG: CPT | Mod: CPTII,S$GLB,,

## 2025-03-10 PROCEDURE — 3044F HG A1C LEVEL LT 7.0%: CPT | Mod: CPTII,S$GLB,,

## 2025-03-10 RX ORDER — METFORMIN HYDROCHLORIDE 500 MG/1
500 TABLET ORAL
Qty: 90 TABLET | Refills: 0 | Status: SHIPPED | OUTPATIENT
Start: 2025-03-10

## 2025-03-22 ENCOUNTER — HOSPITAL ENCOUNTER (OUTPATIENT)
Dept: RADIOLOGY | Facility: HOSPITAL | Age: 55
Discharge: HOME | End: 2025-03-22
Payer: COMMERCIAL

## 2025-03-22 DIAGNOSIS — R91.1 SOLITARY PULMONARY NODULE: ICD-10-CM

## 2025-03-22 PROCEDURE — 71271 CT THORAX LUNG CANCER SCR C-: CPT | Mod: TC

## 2025-03-22 PROCEDURE — 71271 CT THORAX LUNG CANCER SCR C-: CPT | Mod: 26,,, | Performed by: RADIOLOGY

## 2025-03-25 ENCOUNTER — TELEPHONE (OUTPATIENT)
Dept: INTERNAL MEDICINE | Facility: CLINIC | Age: 55
End: 2025-03-25
Payer: COMMERCIAL

## 2025-05-20 ENCOUNTER — TELEPHONE (OUTPATIENT)
Dept: PULMONOLOGY | Facility: CLINIC | Age: 55
End: 2025-05-20
Payer: COMMERCIAL

## 2025-06-25 ENCOUNTER — TELEPHONE (OUTPATIENT)
Dept: PULMONOLOGY | Facility: CLINIC | Age: 55
End: 2025-06-25
Payer: COMMERCIAL

## 2025-09-04 ENCOUNTER — TELEPHONE (OUTPATIENT)
Dept: PULMONOLOGY | Facility: CLINIC | Age: 55
End: 2025-09-04
Payer: COMMERCIAL